# Patient Record
Sex: MALE | Race: WHITE | NOT HISPANIC OR LATINO | Employment: FULL TIME | URBAN - METROPOLITAN AREA
[De-identification: names, ages, dates, MRNs, and addresses within clinical notes are randomized per-mention and may not be internally consistent; named-entity substitution may affect disease eponyms.]

---

## 2018-04-17 ENCOUNTER — CONVERSION ENCOUNTER (OUTPATIENT)
Dept: FAMILY MEDICINE CLINIC | Facility: CLINIC | Age: 63
End: 2018-04-17

## 2018-04-17 LAB
ALBUMIN SERPL-MCNC: 4.3 G/DL (ref 3.6–5.1)
ALBUMIN/GLOB SERPL: ABNORMAL {RATIO} (ref 1–2.5)
ALP SERPL-CCNC: 101 UNITS/L (ref 40–115)
ALT SERPL-CCNC: 17 UNITS/L (ref 9–46)
AST SERPL-CCNC: 19 UNITS/L (ref 10–35)
BILIRUB SERPL-MCNC: 0.9 MG/DL (ref 0.2–1.2)
BUN SERPL-MCNC: 14 MG/DL (ref 7–25)
BUN/CREAT SERPL: ABNORMAL (ref 6–22)
CALCIUM SERPL-MCNC: 9.7 MG/DL (ref 8.6–10.3)
CHLORIDE SERPL-SCNC: 101 MMOL/L (ref 98–110)
CHOLEST SERPL-MCNC: 109 MG/DL
CHOLEST/HDLC SERPL: ABNORMAL {RATIO}
CO2 CONTENT VENOUS: 28 MMOL/L (ref 20–31)
CONV TOTAL PROTEIN: 7.2 G/DL (ref 6.1–8.1)
CREAT UR-MCNC: 1.15 MG/DL (ref 0.7–1.25)
ERYTHROCYTE [DISTWIDTH] IN BLOOD BY AUTOMATED COUNT: 13.6 % (ref 11–15)
GLOBULIN UR ELPH-MCNC: ABNORMAL G/DL (ref 1.9–3.7)
GLUCOSE SERPL-MCNC: 138 MG/DL (ref 65–99)
HCT VFR BLD AUTO: 43.6 % (ref 38.5–50)
HDLC SERPL-MCNC: 33 MG/DL
HGB BLD-MCNC: 15.1 G/DL (ref 13.2–17.1)
LDLC SERPL CALC-MCNC: ABNORMAL MG/DL
MCH RBC QN AUTO: 30.1 PG (ref 27–33)
MCHC RBC AUTO-ENTMCNC: ABNORMAL % (ref 32–36)
MCV RBC AUTO: 87 FL (ref 80–100)
PLATELET # BLD AUTO: ABNORMAL 10*3/MM3 (ref 140–400)
PMV BLD AUTO: 10.6 FL (ref 7.5–12.5)
POTASSIUM SERPL-SCNC: 4.3 MMOL/L (ref 3.5–5.3)
RBC # BLD AUTO: ABNORMAL 10*6/MM3 (ref 4.2–5.8)
SODIUM SERPL-SCNC: 138 MMOL/L (ref 135–146)
TRIGL SERPL-MCNC: 76 MG/DL
WBC # BLD AUTO: ABNORMAL K/UL (ref 3.8–10.8)

## 2019-02-20 ENCOUNTER — ON CAMPUS - OUTPATIENT (OUTPATIENT)
Dept: RURAL HOSPITAL 3 | Facility: HOSPITAL | Age: 64
End: 2019-02-20
Payer: COMMERCIAL

## 2019-02-20 DIAGNOSIS — Z86.010 PERSONAL HISTORY OF COLONIC POLYPS: ICD-10-CM

## 2019-02-20 DIAGNOSIS — K57.30 DIVERTICULOSIS OF LARGE INTESTINE WITHOUT PERFORATION OR ABS: ICD-10-CM

## 2019-02-20 DIAGNOSIS — D12.4 BENIGN NEOPLASM OF DESCENDING COLON: ICD-10-CM

## 2019-02-20 DIAGNOSIS — D12.2 BENIGN NEOPLASM OF ASCENDING COLON: ICD-10-CM

## 2019-02-20 DIAGNOSIS — D12.8 BENIGN NEOPLASM OF RECTUM: ICD-10-CM

## 2019-02-20 PROCEDURE — 45385 COLONOSCOPY W/LESION REMOVAL: CPT | Mod: 33 | Performed by: INTERNAL MEDICINE

## 2019-04-01 ENCOUNTER — HOSPITAL ENCOUNTER (OUTPATIENT)
Dept: OTHER | Facility: HOSPITAL | Age: 64
Discharge: HOME | End: 2019-04-01
Attending: UROLOGY | Admitting: UROLOGY

## 2019-04-23 LAB
ALBUMIN SERPL-MCNC: 4.2 G/DL (ref 3.6–5.1)
ALBUMIN/GLOB SERPL: ABNORMAL {RATIO} (ref 1–2.5)
ALP SERPL-CCNC: 71 UNITS/L (ref 40–115)
ALT SERPL-CCNC: 14 UNITS/L (ref 9–46)
AST SERPL-CCNC: 17 UNITS/L (ref 10–35)
BILIRUB SERPL-MCNC: 0.7 MG/DL (ref 0.2–1.2)
BUN SERPL-MCNC: 14 MG/DL (ref 7–25)
BUN/CREAT SERPL: ABNORMAL (ref 6–22)
CALCIUM SERPL-MCNC: 9.9 MG/DL (ref 8.6–10.3)
CHLORIDE SERPL-SCNC: 103 MMOL/L (ref 98–110)
CHOLEST SERPL-MCNC: 134 MG/DL
CHOLEST/HDLC SERPL: ABNORMAL {RATIO}
CO2 CONTENT VENOUS: 31 MMOL/L (ref 20–32)
CONV TOTAL PROTEIN: 7.1 G/DL (ref 6.1–8.1)
CREAT UR-MCNC: 1.26 MG/DL (ref 0.7–1.25)
GLOBULIN UR ELPH-MCNC: ABNORMAL G/DL (ref 1.9–3.7)
GLUCOSE SERPL-MCNC: 97 MG/DL (ref 65–99)
HDLC SERPL-MCNC: 38 MG/DL
LDLC SERPL CALC-MCNC: ABNORMAL MG/DL
POTASSIUM SERPL-SCNC: 4.3 MMOL/L (ref 3.5–5.3)
PSA SERPL-MCNC: 5.1 NG/ML
SODIUM SERPL-SCNC: 141 MMOL/L (ref 135–146)
TRIGL SERPL-MCNC: 109 MG/DL
TSH SERPL-ACNC: 2.44 MICROINTL UNITS/ML (ref 0.4–4.5)

## 2019-04-24 ENCOUNTER — HOSPITAL ENCOUNTER (OUTPATIENT)
Dept: OTHER | Facility: HOSPITAL | Age: 64
Discharge: HOME | End: 2019-04-24
Attending: NURSE PRACTITIONER | Admitting: NURSE PRACTITIONER

## 2019-07-14 RX ORDER — LISINOPRIL AND HYDROCHLOROTHIAZIDE 20; 12.5 MG/1; MG/1
TABLET ORAL
Qty: 60 TABLET | Refills: 0 | OUTPATIENT
Start: 2019-07-14

## 2019-07-15 DIAGNOSIS — I10 ESSENTIAL HYPERTENSION: Primary | ICD-10-CM

## 2019-07-15 PROBLEM — K21.9 GASTROESOPHAGEAL REFLUX DISEASE: Status: ACTIVE | Noted: 2017-08-24

## 2019-07-15 PROBLEM — R07.9 CHEST PAIN: Status: ACTIVE | Noted: 2019-04-23

## 2019-07-15 PROBLEM — R97.20 ELEVATED PSA: Status: ACTIVE | Noted: 2019-04-24

## 2019-07-15 PROBLEM — N40.0 BENIGN PROSTATIC HYPERPLASIA: Status: ACTIVE | Noted: 2017-06-19

## 2019-07-15 PROBLEM — R06.00 DYSPNEA: Status: ACTIVE | Noted: 2017-08-24

## 2019-07-15 PROBLEM — N28.9 RENAL INSUFFICIENCY, MILD: Status: ACTIVE | Noted: 2019-04-24

## 2019-07-15 PROBLEM — R94.31 LEFT AXIS DEVIATION: Status: ACTIVE | Noted: 2019-04-23

## 2019-07-15 PROBLEM — E78.5 HYPERLIPIDEMIA: Status: ACTIVE | Noted: 2017-06-19

## 2019-07-15 RX ORDER — SIMVASTATIN 40 MG
TABLET ORAL EVERY 24 HOURS
COMMUNITY
Start: 2017-10-15 | End: 2019-08-28 | Stop reason: ALTCHOICE

## 2019-07-15 RX ORDER — METOPROLOL SUCCINATE 50 MG/1
TABLET, EXTENDED RELEASE ORAL EVERY 24 HOURS
COMMUNITY
Start: 2018-04-23 | End: 2020-12-29 | Stop reason: SDUPTHER

## 2019-07-15 RX ORDER — AMLODIPINE BESYLATE 10 MG/1
TABLET ORAL EVERY 24 HOURS
COMMUNITY
Start: 2017-10-15 | End: 2019-08-28 | Stop reason: SDUPTHER

## 2019-07-15 RX ORDER — LISINOPRIL AND HYDROCHLOROTHIAZIDE 20; 12.5 MG/1; MG/1
TABLET ORAL EVERY 24 HOURS
COMMUNITY
Start: 2018-05-15 | End: 2019-07-15 | Stop reason: SDUPTHER

## 2019-07-22 RX ORDER — LISINOPRIL AND HYDROCHLOROTHIAZIDE 20; 12.5 MG/1; MG/1
2 TABLET ORAL EVERY 24 HOURS
Qty: 60 TABLET | Refills: 0 | Status: SHIPPED | OUTPATIENT
Start: 2019-07-22 | End: 2019-08-29 | Stop reason: SDUPTHER

## 2019-07-31 RX ORDER — OMEPRAZOLE 20 MG/1
CAPSULE, DELAYED RELEASE ORAL
Qty: 30 CAPSULE | Refills: 5 | Status: SHIPPED | OUTPATIENT
Start: 2019-07-31 | End: 2020-02-03 | Stop reason: SDUPTHER

## 2019-08-06 ENCOUNTER — TELEPHONE (OUTPATIENT)
Dept: FAMILY MEDICINE CLINIC | Facility: CLINIC | Age: 64
End: 2019-08-06

## 2019-08-06 DIAGNOSIS — R79.89 ELEVATED SERUM CREATININE: Primary | ICD-10-CM

## 2019-08-06 NOTE — TELEPHONE ENCOUNTER
Patient was advised on his voicemail. Order in chart.     ----- Message from Deidra Mahan MA sent at 7/18/2019 12:05 PM EDT -----  Regarding: BMP due  BMP due

## 2019-08-11 ENCOUNTER — RESULTS ENCOUNTER (OUTPATIENT)
Dept: FAMILY MEDICINE CLINIC | Facility: CLINIC | Age: 64
End: 2019-08-11

## 2019-08-11 DIAGNOSIS — R79.89 ELEVATED SERUM CREATININE: ICD-10-CM

## 2019-08-28 RX ORDER — AMLODIPINE BESYLATE 10 MG/1
TABLET ORAL
Qty: 30 TABLET | Refills: 3 | Status: SHIPPED | OUTPATIENT
Start: 2019-08-28 | End: 2020-01-11

## 2019-08-29 DIAGNOSIS — I10 ESSENTIAL HYPERTENSION: ICD-10-CM

## 2019-08-30 ENCOUNTER — TELEPHONE (OUTPATIENT)
Dept: FAMILY MEDICINE CLINIC | Facility: CLINIC | Age: 64
End: 2019-08-30

## 2019-08-30 RX ORDER — LISINOPRIL AND HYDROCHLOROTHIAZIDE 20; 12.5 MG/1; MG/1
TABLET ORAL
Qty: 60 TABLET | Refills: 0 | Status: SHIPPED | OUTPATIENT
Start: 2019-08-30 | End: 2019-09-11 | Stop reason: SDUPTHER

## 2019-08-30 NOTE — TELEPHONE ENCOUNTER
I called him on 08/12/2019 and left a message reminding him. He has yet to do the labs. Will call him again today. I have the Cardiovascular Report and Stress printed and on your desk.

## 2019-08-30 NOTE — TELEPHONE ENCOUNTER
Getting refills on meds, when pt was to have labs on 8/6.   Please see if he had labs somewhere?  I do not have them.  I see that I sent him for a cardiac work up in April, but I can't see the note for fu.  Please get the note from the other system

## 2019-08-31 LAB
BUN SERPL-MCNC: 20 MG/DL (ref 8–27)
BUN/CREAT SERPL: 16 (ref 10–24)
CALCIUM SERPL-MCNC: 9.4 MG/DL (ref 8.6–10.2)
CHLORIDE SERPL-SCNC: 104 MMOL/L (ref 96–106)
CO2 SERPL-SCNC: 23 MMOL/L (ref 20–29)
CREAT SERPL-MCNC: 1.26 MG/DL (ref 0.76–1.27)
GLUCOSE SERPL-MCNC: 166 MG/DL (ref 65–99)
POTASSIUM SERPL-SCNC: 4.2 MMOL/L (ref 3.5–5.2)
SODIUM SERPL-SCNC: 142 MMOL/L (ref 134–144)

## 2019-09-11 ENCOUNTER — OFFICE VISIT (OUTPATIENT)
Dept: FAMILY MEDICINE CLINIC | Facility: CLINIC | Age: 64
End: 2019-09-11

## 2019-09-11 VITALS
RESPIRATION RATE: 16 BRPM | HEIGHT: 75 IN | WEIGHT: 244 LBS | HEART RATE: 51 BPM | OXYGEN SATURATION: 98 % | TEMPERATURE: 98 F | DIASTOLIC BLOOD PRESSURE: 92 MMHG | SYSTOLIC BLOOD PRESSURE: 144 MMHG | BODY MASS INDEX: 30.34 KG/M2

## 2019-09-11 DIAGNOSIS — N28.9 RENAL INSUFFICIENCY, MILD: Primary | ICD-10-CM

## 2019-09-11 DIAGNOSIS — I10 ESSENTIAL HYPERTENSION: ICD-10-CM

## 2019-09-11 DIAGNOSIS — C61 PROSTATE CANCER (HCC): ICD-10-CM

## 2019-09-11 PROCEDURE — 99213 OFFICE O/P EST LOW 20 MIN: CPT | Performed by: NURSE PRACTITIONER

## 2019-09-11 RX ORDER — LISINOPRIL AND HYDROCHLOROTHIAZIDE 20; 12.5 MG/1; MG/1
2 TABLET ORAL DAILY
Qty: 60 TABLET | Refills: 0 | Status: SHIPPED | OUTPATIENT
Start: 2019-09-11 | End: 2020-02-26

## 2019-09-11 NOTE — PROGRESS NOTES
Chief Complaint   Patient presents with   • Hyperglycemia   • Hypertension   • Elevated PSA        Subjective   Sergei Vance is a 64 y.o. male who presents today for htn and labs    HPI:  Pt was to have cr repeat a while back.  He also had an increase in b/p meds and had not returned.  He had labs last week and his bs was 166.  He now says he was not fasting.  He needs a b/p refill and he is fu on a plan for monitoring prostate cancer after a surgeon appt.  He has a fu next week with Dr Bradley.    Sergei Vance  has a past medical history of GERD (gastroesophageal reflux disease) and Hypertension.    No Known Allergies    Current Outpatient Medications:   •  amLODIPine (NORVASC) 10 MG tablet, TAKE 1 TABLET BY MOUTH EVERY DAY, Disp: 30 tablet, Rfl: 3  •  lisinopril-hydrochlorothiazide (PRINZIDE,ZESTORETIC) 20-12.5 MG per tablet, Take 2 tablets by mouth Daily., Disp: 60 tablet, Rfl: 0  •  metoprolol succinate XL (TOPROL-XL) 50 MG 24 hr tablet, Daily., Disp: , Rfl:   •  omeprazole (priLOSEC) 20 MG capsule, TAKE 1 CAPSULE BY MOUTH EVERY MORNING ON EMPTY STOMACH, Disp: 30 capsule, Rfl: 5  Past Medical History:   Diagnosis Date   • GERD (gastroesophageal reflux disease)    • Hypertension      Past Surgical History:   Procedure Laterality Date   • VASECTOMY       Social History     Socioeconomic History   • Marital status:      Spouse name: Not on file   • Number of children: Not on file   • Years of education: Not on file   • Highest education level: Not on file   Tobacco Use   • Smoking status: Never Smoker   • Smokeless tobacco: Never Used   Substance and Sexual Activity   • Alcohol use: Yes     Frequency: Never   • Drug use: No     Family History   Problem Relation Age of Onset   • No Known Problems Mother    • No Known Problems Father        Family history, surgical history, past medical history, Allergies and med's reviewed with patient today and updated in [facility] EMR.   PHQ-2 Depression Screening  Little  "interest or pleasure in doing things? 0   Feeling down, depressed, or hopeless? 0   PHQ-2 Total Score 0   PHQ-9 Depression Screening  Little interest or pleasure in doing things? 0   Feeling down, depressed, or hopeless? 0   Trouble falling or staying asleep, or sleeping too much?     Feeling tired or having little energy?     Poor appetite or overeating?     Feeling bad about yourself - or that you are a failure or have let yourself or your family down?     Trouble concentrating on things, such as reading the newspaper or watching television?     Moving or speaking so slowly that other people could have noticed? Or the opposite - being so fidgety or restless that you have been moving around a lot more than usual?     Thoughts that you would be better off dead, or of hurting yourself in some way?     PHQ-9 Total Score 0   If you checked off any problems, how difficult have these problems made it for you to do your work, take care of things at home, or get along with other people?       ROS:  Review of Systems   Constitutional: Negative for fatigue and fever.   Respiratory: Negative for shortness of breath.    Cardiovascular: Negative for chest pain.   Endocrine: Negative for polyphagia and polyuria.       OBJECTIVE:  Vitals:    09/11/19 1032   BP: 144/92   BP Location: Left arm   Patient Position: Sitting   Cuff Size: Large Adult   Pulse: 51   Resp: 16   Temp: 98 °F (36.7 °C)   TempSrc: Oral   SpO2: 98%   Weight: 111 kg (244 lb)   Height: 190.5 cm (75\")     Physical Exam   Cardiovascular: Normal rate, regular rhythm, normal heart sounds and intact distal pulses. Exam reveals no gallop and no friction rub.   No murmur heard.  Pulmonary/Chest: Breath sounds normal. He has no wheezes.   Nursing note and vitals reviewed.      ASSESSMENT/ PLAN:    Sergei was seen today for hyperglycemia, hypertension and elevated psa.    Diagnoses and all orders for this visit:    Renal insufficiency, mild  Comments:  labs are normal "   elevated bs was nonfasting    Essential hypertension  Comments:  cont meds  Orders:  -     lisinopril-hydrochlorothiazide (PRINZIDE,ZESTORETIC) 20-12.5 MG per tablet; Take 2 tablets by mouth Daily.    Prostate cancer (CMS/HCC)  Comments:  cont work up         Orders Placed Today:     New Medications Ordered This Visit   Medications   • lisinopril-hydrochlorothiazide (PRINZIDE,ZESTORETIC) 20-12.5 MG per tablet     Sig: Take 2 tablets by mouth Daily.     Dispense:  60 tablet     Refill:  0        Management Plan:     An After Visit Summary was printed and given to the patient at discharge.    Follow-up: Return April 2019, for Annual physical.    JO ANN Diaz 9/11/2019 12:23 PM  This note was electronically signed.

## 2019-09-23 ENCOUNTER — OFFICE VISIT (OUTPATIENT)
Dept: FAMILY MEDICINE CLINIC | Facility: CLINIC | Age: 64
End: 2019-09-23

## 2019-09-23 VITALS
SYSTOLIC BLOOD PRESSURE: 132 MMHG | HEART RATE: 50 BPM | OXYGEN SATURATION: 98 % | TEMPERATURE: 98 F | RESPIRATION RATE: 18 BRPM | WEIGHT: 245 LBS | BODY MASS INDEX: 35.07 KG/M2 | HEIGHT: 70 IN | DIASTOLIC BLOOD PRESSURE: 68 MMHG

## 2019-09-23 DIAGNOSIS — K64.4 RESIDUAL HEMORRHOIDAL SKIN TAGS: Primary | ICD-10-CM

## 2019-09-23 DIAGNOSIS — C61 PROSTATE CANCER (HCC): ICD-10-CM

## 2019-09-23 PROCEDURE — 99213 OFFICE O/P EST LOW 20 MIN: CPT | Performed by: FAMILY MEDICINE

## 2019-09-23 RX ORDER — DIAZEPAM 10 MG/1
TABLET ORAL
Refills: 0 | COMMUNITY
Start: 2019-09-16 | End: 2020-07-29

## 2019-09-23 NOTE — ASSESSMENT & PLAN NOTE
I am not too concerned about cancer since he recently had a colonoscopy that was unremarkable.  I think he has an anal tag versus hemorrhoid that is causing his bleeding.  It is mild in nature.  We will go ahead and set him up with surgery for excision.

## 2019-09-23 NOTE — PROGRESS NOTES
Patient presents with rectal bleeding that is mild in nature for the past several months.  He had a normal colonoscopy earlier this year.  He had recently diagnosed prostate cancer and has undergone radiation, however the radiation treatment did not start until after the rectal bleeding.  He has no diarrhea or pain.  He has not had weight loss.  He does have some external hemorrhoids.      Past Medical History:   Diagnosis Date   • Abnormal stress test    • BPH (benign prostatic hyperplasia)    • Elevated PSA    • GERD (gastroesophageal reflux disease)    • Hyperlipidemia    • Hypertension    • Prostate cancer (CMS/HCC)    • Renal insufficiency      Past Surgical History:   Procedure Laterality Date   • VASECTOMY       Family History   Problem Relation Age of Onset   • No Known Problems Mother    • No Known Problems Father    • Colon cancer Maternal Grandmother    • Prostate cancer Maternal Grandfather      Social History     Tobacco Use   • Smoking status: Former Smoker     Types: Cigarettes     Last attempt to quit: 2009     Years since quitting: 10.7   • Smokeless tobacco: Former User     Types: Chew     Quit date: 2016   Substance Use Topics   • Alcohol use: Yes     Alcohol/week: 1.2 oz     Types: 2 Cans of beer per week     Frequency: Never     PHQ-2 Depression Screening  Little interest or pleasure in doing things?     Feeling down, depressed, or hopeless?     PHQ-2 Total Score       PHQ-9 Depression Screening  Little interest or pleasure in doing things?     Feeling down, depressed, or hopeless?     Trouble falling or staying asleep, or sleeping too much?     Feeling tired or having little energy?     Poor appetite or overeating?     Feeling bad about yourself - or that you are a failure or have let yourself or your family down?     Trouble concentrating on things, such as reading the newspaper or watching television?     Moving or speaking so slowly that other people could have noticed? Or the opposite -  "being so fidgety or restless that you have been moving around a lot more than usual?     Thoughts that you would be better off dead, or of hurting yourself in some way?     PHQ-9 Total Score     If you checked off any problems, how difficult have these problems made it for you to do your work, take care of things at home, or get along with other people?         Review of Systems   Constitutional: Negative for chills and fatigue.   Respiratory: Negative for cough and shortness of breath.    Cardiovascular: Negative for chest pain and palpitations.   Gastrointestinal: Positive for anal bleeding and blood in stool.   Musculoskeletal: Negative for arthralgias, back pain and myalgias.   Skin: Negative for rash.   Neurological: Negative for dizziness and headache.     Vitals:    09/23/19 0946   BP: 144/78   BP Location: Left arm   Cuff Size: Adult   Pulse: 50   Resp: 18   Temp: 98 °F (36.7 °C)   TempSrc: Oral   SpO2: 98%   Weight: 111 kg (245 lb)   Height: 177.8 cm (70\")     Body mass index is 35.15 kg/m².  Physical Exam   Constitutional: He is oriented to person, place, and time. He appears well-developed and well-nourished. No distress.   Cardiovascular: Normal rate, regular rhythm and normal heart sounds.   No murmur heard.  Pulmonary/Chest: Effort normal and breath sounds normal. He has no wheezes. He has no rales.   Abdominal: Soft. Bowel sounds are normal. He exhibits no distension.   Genitourinary: Rectal exam shows external hemorrhoid.   Genitourinary Comments: With large hemorrhoid/anal tag   Musculoskeletal: Normal range of motion.   Neurological: He is alert and oriented to person, place, and time.   Skin: Skin is warm and dry.   Psychiatric: He has a normal mood and affect. His behavior is normal.     No visits with results within 7 Day(s) from this visit.   Latest known visit with results is:   Results Encounter on 08/11/2019   Component Date Value Ref Range Status   • Glucose 08/30/2019 166* 65 - 99 mg/dL " Final   • BUN 08/30/2019 20  8 - 27 mg/dL Final   • Creatinine 08/30/2019 1.26  0.76 - 1.27 mg/dL Final   • eGFR Non African Am 08/30/2019 60  >59 mL/min/1.73 Final   • eGFR African Am 08/30/2019 70  >59 mL/min/1.73 Final   • BUN/Creatinine Ratio 08/30/2019 16  10 - 24 Final   • Sodium 08/30/2019 142  134 - 144 mmol/L Final   • Potassium 08/30/2019 4.2  3.5 - 5.2 mmol/L Final   • Chloride 08/30/2019 104  96 - 106 mmol/L Final   • Total CO2 08/30/2019 23  20 - 29 mmol/L Final   • Calcium 08/30/2019 9.4  8.6 - 10.2 mg/dL Final         Diagnoses and all orders for this visit:    1. Residual hemorrhoidal skin tags (Primary)  Assessment & Plan:  I am not too concerned about cancer since he recently had a colonoscopy that was unremarkable.  I think he has an anal tag versus hemorrhoid that is causing his bleeding.  It is mild in nature.  We will go ahead and set him up with surgery for excision.     Orders:  -     Ambulatory Referral to General Surgery    2. Prostate cancer (CMS/HCC)  Assessment & Plan:  Managed by Dr. Bradley with recent radiation.  I do not believe this is radiation proctitis since the bleeding preceded the initiation of radiation therapy.

## 2019-09-23 NOTE — ASSESSMENT & PLAN NOTE
Managed by Dr. Bradley with recent radiation.  I do not believe this is radiation proctitis since the bleeding preceded the initiation of radiation therapy.

## 2019-09-24 ENCOUNTER — TELEPHONE (OUTPATIENT)
Dept: FAMILY MEDICINE CLINIC | Facility: CLINIC | Age: 64
End: 2019-09-24

## 2019-09-24 NOTE — TELEPHONE ENCOUNTER
----- Message from Reggie Duane Lyell, MD sent at 9/23/2019  9:51 AM EDT -----  Get records from Dr. Rosendo Bradley

## 2019-10-01 ENCOUNTER — TELEPHONE (OUTPATIENT)
Dept: FAMILY MEDICINE CLINIC | Facility: CLINIC | Age: 64
End: 2019-10-01

## 2019-10-15 ENCOUNTER — OFFICE VISIT (OUTPATIENT)
Dept: SURGERY | Facility: CLINIC | Age: 64
End: 2019-10-15

## 2019-10-15 VITALS
OXYGEN SATURATION: 98 % | WEIGHT: 245.2 LBS | SYSTOLIC BLOOD PRESSURE: 152 MMHG | HEIGHT: 70 IN | TEMPERATURE: 97.3 F | HEART RATE: 60 BPM | DIASTOLIC BLOOD PRESSURE: 92 MMHG | BODY MASS INDEX: 35.1 KG/M2

## 2019-10-15 DIAGNOSIS — K64.4 RESIDUAL HEMORRHOIDAL SKIN TAGS: Primary | ICD-10-CM

## 2019-10-15 PROCEDURE — 99203 OFFICE O/P NEW LOW 30 MIN: CPT | Performed by: SURGERY

## 2019-10-15 NOTE — PROGRESS NOTES
Subjective   Sergei Vance is a 64 y.o. male.     History of present illness  Sergei is a pleasant 64-year-old seen in the office today at his primary physician's request of Dr. Lugo, for symptomatic external hemorrhoid tags.  Patient states that he has had significant problems in the past with hemorrhoids.  For the past year he has had some spotting when wiping and some discomfort.  He assumed that it was from the hemorrhoids.  A colonoscopy recently and it showed some polyps which were removed but no other abnormality.        Past Medical History:   Diagnosis Date   • Abnormal stress test    • BPH (benign prostatic hyperplasia)    • Elevated PSA    • GERD (gastroesophageal reflux disease)    • Hyperlipidemia    • Hypertension    • Prostate cancer (CMS/HCC)    • Renal insufficiency        Past Surgical History:   Procedure Laterality Date   • VASECTOMY         Outpatient Encounter Medications as of 10/15/2019   Medication Sig Dispense Refill   • amLODIPine (NORVASC) 10 MG tablet TAKE 1 TABLET BY MOUTH EVERY DAY 30 tablet 3   • diazePAM (VALIUM) 10 MG tablet TAKE 2 TABLETS BY MOUTH 1 HOUR PRIOR TO APPOINTMENT  0   • lisinopril-hydrochlorothiazide (PRINZIDE,ZESTORETIC) 20-12.5 MG per tablet Take 2 tablets by mouth Daily. 60 tablet 0   • metoprolol succinate XL (TOPROL-XL) 50 MG 24 hr tablet Daily.     • omeprazole (priLOSEC) 20 MG capsule TAKE 1 CAPSULE BY MOUTH EVERY MORNING ON EMPTY STOMACH 30 capsule 5     No facility-administered encounter medications on file as of 10/15/2019.        No Known Allergies    Family History   Problem Relation Age of Onset   • No Known Problems Mother    • No Known Problems Father    • Colon cancer Maternal Grandmother    • Prostate cancer Maternal Grandfather        Social History     Socioeconomic History   • Marital status:      Spouse name: Not on file   • Number of children: Not on file   • Years of education: Not on file   • Highest education level: Not on file    Tobacco Use   • Smoking status: Former Smoker     Types: Cigarettes     Last attempt to quit: 2009     Years since quitting: 10.7   • Smokeless tobacco: Former User     Types: Chew     Quit date: 2016   Substance and Sexual Activity   • Alcohol use: Yes     Alcohol/week: 1.2 oz     Types: 2 Cans of beer per week     Frequency: Never   • Drug use: No       The following portions of the patient's history were reviewed and updated as appropriate: allergies, current medications, past family history, past medical history, past social history, past surgical history and problem list.    Objective       Assessment/Plan   There are no diagnoses linked to this encounter.      Complete review of systems is done and unremarkable with exception of the chief complaint.      Exam shows a pleasant 64-year-old male.  HEENT is negative.  Heart regular.  Lungs clear.  Abdomen soft nontender.  Extremities show equal range of motion in the upper and lower extremities.  He has symmetrical strength and usage.  Neuro shows no obvious focal deficit.  Rectal exam shows an active external skin tags and a posterior midline fissure.    Impression: Posterior midline fissure, inactive hemorrhoid tags    Recommendation: He needs to be on a stool softener daily, use Anusol HC cream 3 times daily for a week then twice daily for a week and then daily so long as he is having bleeding from the fissure    Call if he decides to proceed with surgery.  If we do surgery we would remove the tags and doing a lateral internal sphincterotomy.    Ramakrishna Magdaleno,   10/15/2019  11:49 AM

## 2020-01-11 RX ORDER — AMLODIPINE BESYLATE 10 MG/1
TABLET ORAL
Qty: 30 TABLET | Refills: 3 | Status: SHIPPED | OUTPATIENT
Start: 2020-01-11 | End: 2020-04-26

## 2020-02-03 RX ORDER — OMEPRAZOLE 20 MG/1
CAPSULE, DELAYED RELEASE ORAL
Qty: 30 CAPSULE | Refills: 5 | Status: SHIPPED | OUTPATIENT
Start: 2020-02-03 | End: 2020-12-29 | Stop reason: SDUPTHER

## 2020-02-11 ENCOUNTER — TELEPHONE (OUTPATIENT)
Dept: FAMILY MEDICINE CLINIC | Facility: CLINIC | Age: 65
End: 2020-02-11

## 2020-02-11 NOTE — TELEPHONE ENCOUNTER
Patient called and says that his eye doctor is wanting him to take Areds 2 formula eye vitamins. But was told to check with you to make sure its okay to take these with his current meds. Please advise.

## 2020-02-26 DIAGNOSIS — I10 ESSENTIAL HYPERTENSION: ICD-10-CM

## 2020-02-26 RX ORDER — LISINOPRIL AND HYDROCHLOROTHIAZIDE 20; 12.5 MG/1; MG/1
TABLET ORAL
Qty: 60 TABLET | Refills: 0 | Status: SHIPPED | OUTPATIENT
Start: 2020-02-26 | End: 2020-03-25 | Stop reason: SDUPTHER

## 2020-03-23 PROBLEM — D36.9 ADENOMATOUS POLYP: Status: ACTIVE | Noted: 2020-03-23

## 2020-03-25 DIAGNOSIS — I10 ESSENTIAL HYPERTENSION: ICD-10-CM

## 2020-03-25 RX ORDER — LISINOPRIL AND HYDROCHLOROTHIAZIDE 20; 12.5 MG/1; MG/1
TABLET ORAL
Qty: 60 TABLET | Refills: 0 | Status: SHIPPED | OUTPATIENT
Start: 2020-03-25 | End: 2020-04-20

## 2020-04-19 DIAGNOSIS — I10 ESSENTIAL HYPERTENSION: ICD-10-CM

## 2020-04-20 RX ORDER — LISINOPRIL AND HYDROCHLOROTHIAZIDE 20; 12.5 MG/1; MG/1
TABLET ORAL
Qty: 60 TABLET | Refills: 0 | Status: SHIPPED | OUTPATIENT
Start: 2020-04-20 | End: 2020-05-17 | Stop reason: SDUPTHER

## 2020-04-26 RX ORDER — AMLODIPINE BESYLATE 10 MG/1
TABLET ORAL
Qty: 30 TABLET | Refills: 3 | Status: SHIPPED | OUTPATIENT
Start: 2020-04-26 | End: 2020-08-24

## 2020-05-17 DIAGNOSIS — I10 ESSENTIAL HYPERTENSION: ICD-10-CM

## 2020-05-17 RX ORDER — LISINOPRIL AND HYDROCHLOROTHIAZIDE 20; 12.5 MG/1; MG/1
TABLET ORAL
Qty: 60 TABLET | Refills: 0 | Status: SHIPPED | OUTPATIENT
Start: 2020-05-17 | End: 2020-06-15

## 2020-06-15 DIAGNOSIS — I10 ESSENTIAL HYPERTENSION: ICD-10-CM

## 2020-06-15 RX ORDER — LISINOPRIL AND HYDROCHLOROTHIAZIDE 20; 12.5 MG/1; MG/1
TABLET ORAL
Qty: 60 TABLET | Refills: 0 | Status: SHIPPED | OUTPATIENT
Start: 2020-06-15 | End: 2020-07-08

## 2020-07-08 DIAGNOSIS — I10 ESSENTIAL HYPERTENSION: ICD-10-CM

## 2020-07-08 RX ORDER — LISINOPRIL AND HYDROCHLOROTHIAZIDE 20; 12.5 MG/1; MG/1
TABLET ORAL
Qty: 60 TABLET | Refills: 0 | Status: SHIPPED | OUTPATIENT
Start: 2020-07-08 | End: 2020-08-04

## 2020-07-29 ENCOUNTER — OFFICE VISIT (OUTPATIENT)
Dept: FAMILY MEDICINE CLINIC | Facility: CLINIC | Age: 65
End: 2020-07-29

## 2020-07-29 VITALS
HEART RATE: 76 BPM | WEIGHT: 242 LBS | TEMPERATURE: 97.9 F | SYSTOLIC BLOOD PRESSURE: 148 MMHG | RESPIRATION RATE: 16 BRPM | BODY MASS INDEX: 34.65 KG/M2 | HEIGHT: 70 IN | OXYGEN SATURATION: 97 % | DIASTOLIC BLOOD PRESSURE: 85 MMHG

## 2020-07-29 DIAGNOSIS — N28.9 RENAL INSUFFICIENCY, MILD: ICD-10-CM

## 2020-07-29 DIAGNOSIS — E78.2 MIXED HYPERLIPIDEMIA: ICD-10-CM

## 2020-07-29 DIAGNOSIS — Z00.00 ENCOUNTER FOR PREVENTIVE HEALTH EXAMINATION: Primary | ICD-10-CM

## 2020-07-29 DIAGNOSIS — Z11.59 NEED FOR HEPATITIS C SCREENING TEST: ICD-10-CM

## 2020-07-29 DIAGNOSIS — N40.1 BENIGN PROSTATIC HYPERPLASIA WITH NOCTURIA: ICD-10-CM

## 2020-07-29 DIAGNOSIS — C61 PROSTATE CANCER (HCC): ICD-10-CM

## 2020-07-29 DIAGNOSIS — R35.1 BENIGN PROSTATIC HYPERPLASIA WITH NOCTURIA: ICD-10-CM

## 2020-07-29 DIAGNOSIS — K21.9 GASTROESOPHAGEAL REFLUX DISEASE, ESOPHAGITIS PRESENCE NOT SPECIFIED: ICD-10-CM

## 2020-07-29 DIAGNOSIS — I10 ESSENTIAL HYPERTENSION: ICD-10-CM

## 2020-07-29 LAB
BILIRUB BLD-MCNC: NEGATIVE MG/DL
CLARITY, POC: CLEAR
COLOR UR: YELLOW
GLUCOSE UR STRIP-MCNC: NEGATIVE MG/DL
KETONES UR QL: NEGATIVE
LEUKOCYTE EST, POC: NEGATIVE
NITRITE UR-MCNC: NEGATIVE MG/ML
PH UR: 6.5 [PH] (ref 5–8)
PROT UR STRIP-MCNC: NEGATIVE MG/DL
RBC # UR STRIP: NEGATIVE /UL
SP GR UR: 1.02 (ref 1–1.03)
UROBILINOGEN UR QL: NORMAL

## 2020-07-29 PROCEDURE — 81003 URINALYSIS AUTO W/O SCOPE: CPT | Performed by: NURSE PRACTITIONER

## 2020-07-29 PROCEDURE — 99396 PREV VISIT EST AGE 40-64: CPT | Performed by: NURSE PRACTITIONER

## 2020-07-29 NOTE — PROGRESS NOTES
Chief Complaint   Patient presents with   • Annual Exam   • Hypertension   • Hyperlipidemia        Subjective   Sergei Vance is a 65 y.o. male who presents today for annual exam, hypertension hyperlipidemia and colon cancer    HPI: Patient is here for his annual exam today.  We have gone over his immunizations in detail.  As well as recommended screenings.  He is not due for colonoscopy.  He has had an eye exam.  He does follow-up with Dr. Bradley for prostate cancer and is trying to make a determination on how that is going to be treated    Patient is taking his blood pressure medicine as prescribed without any side effects as well as trying to watch his diet for his cholesterol.  He does not exercise regularly.  He has no chest pain shortness of breath or cough.    Patient has had some initial signs of glaucoma and cataract is currently being monitored by the ophthalmologist every 3 months.  He has no new or different complaints at this point.            Sergei Vance  has a past medical history of Abnormal stress test, Adenomatous polyp (3/23/2020), BPH (benign prostatic hyperplasia), Elevated PSA, GERD (gastroesophageal reflux disease), Hyperlipidemia, Hypertension, Prostate cancer (CMS/HCC), and Renal insufficiency.    No Known Allergies    Current Outpatient Medications:   •  amLODIPine (NORVASC) 10 MG tablet, TAKE 1 TABLET BY MOUTH EVERY DAY, Disp: 30 tablet, Rfl: 3  •  lisinopril-hydrochlorothiazide (PRINZIDE,ZESTORETIC) 20-12.5 MG per tablet, TAKE 2 TABLETS BY MOUTH EVERY DAY, Disp: 60 tablet, Rfl: 0  •  metoprolol succinate XL (TOPROL-XL) 50 MG 24 hr tablet, Daily., Disp: , Rfl:   •  omeprazole (priLOSEC) 20 MG capsule, TAKE 1 CAPSULE BY MOUTH EVERY MORNING ON EMPTY STOMACH, Disp: 30 capsule, Rfl: 5  Past Medical History:   Diagnosis Date   • Abnormal stress test    • Adenomatous polyp 3/23/2020   • BPH (benign prostatic hyperplasia)    • Elevated PSA    • GERD (gastroesophageal reflux disease)    •  Hyperlipidemia    • Hypertension    • Prostate cancer (CMS/HCC)    • Renal insufficiency      Past Surgical History:   Procedure Laterality Date   • VASECTOMY       Social History     Socioeconomic History   • Marital status:      Spouse name: Not on file   • Number of children: Not on file   • Years of education: Not on file   • Highest education level: Not on file   Tobacco Use   • Smoking status: Former Smoker     Packs/day: 1.00     Types: Cigarettes     Start date:      Last attempt to quit: 2009     Years since quittin.5   • Smokeless tobacco: Former User     Types: Chew     Quit date:    Substance and Sexual Activity   • Alcohol use: Yes     Alcohol/week: 2.0 standard drinks     Types: 2 Cans of beer per week     Frequency: Never   • Drug use: No     Family History   Problem Relation Age of Onset   • No Known Problems Mother    • No Known Problems Father    • Colon cancer Maternal Grandmother    • Prostate cancer Maternal Grandfather        Family history, surgical history, past medical history, Allergies and med's reviewed with patient today and updated in [facility] EMR.   PHQ-2 Depression Screening  Little interest or pleasure in doing things?     Feeling down, depressed, or hopeless?     PHQ-2 Total Score     PHQ-9 Depression Screening  Little interest or pleasure in doing things?     Feeling down, depressed, or hopeless?     Trouble falling or staying asleep, or sleeping too much?     Feeling tired or having little energy?     Poor appetite or overeating?     Feeling bad about yourself - or that you are a failure or have let yourself or your family down?     Trouble concentrating on things, such as reading the newspaper or watching television?     Moving or speaking so slowly that other people could have noticed? Or the opposite - being so fidgety or restless that you have been moving around a lot more than usual?     Thoughts that you would be better off dead, or of hurting yourself in  "some way?     PHQ-9 Total Score     If you checked off any problems, how difficult have these problems made it for you to do your work, take care of things at home, or get along with other people?       ROS:  Review of Systems   Constitutional: Negative for fatigue.   HENT: Negative for ear pain, sinus pain and sore throat.    Eyes: Positive for visual disturbance. Negative for pain.        Being followed by ophthalmology   Respiratory: Negative for cough and shortness of breath.    Cardiovascular: Negative for chest pain.   Gastrointestinal: Negative for abdominal pain, diarrhea and nausea.   Endocrine: Negative for polyuria.   Genitourinary: Positive for difficulty urinating. Negative for decreased urine volume and dysuria.        Prostate enlargement and prostate cancer followed by Dr. Bradley   Musculoskeletal: Negative for arthralgias.   Skin: Negative for rash.   Allergic/Immunologic: Negative for environmental allergies.   Neurological: Negative for dizziness, numbness and headaches.   Hematological: Does not bruise/bleed easily.   Psychiatric/Behavioral: The patient is not nervous/anxious.        OBJECTIVE:  Vitals:    07/29/20 0910   BP: 148/85   BP Location: Left arm   Patient Position: Sitting   Cuff Size: Large Adult   Pulse: 76   Resp: 16   Temp: 97.9 °F (36.6 °C)   TempSrc: Temporal   SpO2: 97%   Weight: 110 kg (242 lb)   Height: 177.8 cm (70\")     Physical Exam   Constitutional: He is oriented to person, place, and time. Vital signs are normal. He appears well-developed.   HENT:   Head: Normocephalic and atraumatic.   Right Ear: Tympanic membrane, external ear and ear canal normal.   Left Ear: Tympanic membrane, external ear and ear canal normal.   Nose: Nose normal.   Mouth/Throat: Uvula is midline, oropharynx is clear and moist and mucous membranes are normal.   Eyes: Pupils are equal, round, and reactive to light. Conjunctivae are normal.   Neck: Normal range of motion. Neck supple.   Cardiovascular: " Regular rhythm and normal heart sounds. Exam reveals no gallop and no friction rub.   No murmur heard.  Pulmonary/Chest: Effort normal and breath sounds normal. He has no wheezes. He has no rhonchi.   Abdominal: Soft. Bowel sounds are normal. He exhibits no distension. There is no hepatosplenomegaly. There is no tenderness.   Musculoskeletal: Normal range of motion.   Neurological: He is alert and oriented to person, place, and time. He displays normal reflexes. No cranial nerve deficit. Coordination normal.   Skin: Skin is warm and dry.   Nursing note and vitals reviewed.      ASSESSMENT/ PLAN:    Sergei was seen today for annual exam, hypertension and hyperlipidemia.    Diagnoses and all orders for this visit:    Encounter for preventive health examination  Comments:  Discussed anticipatory guidance as well as immunizations  Orders:  -     Comprehensive Metabolic Panel  -     Lipid Panel  -     POC Urinalysis Dipstick, Automated    Mixed hyperlipidemia  Comments:  Labs being done today to monitor hyperlipidemia  Orders:  -     Comprehensive Metabolic Panel  -     Lipid Panel    Need for hepatitis C screening test  -     Hepatitis C Antibody    Essential hypertension  Comments:  Continue current blood pressure medications    Gastroesophageal reflux disease, esophagitis presence not specified  Comments:  Only uses Prilosec as needed    Prostate cancer (CMS/HCC)  Comments:  Continue follow-up with Dr. Bradley in October.  Consider option for treatment    Renal insufficiency, mild  Comments:  Labs done today    Benign prostatic hyperplasia with nocturia        Orders Placed Today:     No orders of the defined types were placed in this encounter.       Management Plan:     An After Visit Summary was printed and given to the patient at discharge.    Follow-up: Return in about 6 months (around 1/29/2021) for Recheck htn.    JO ANN Diaz 7/29/2020 09:44  This note was electronically signed.

## 2020-07-30 LAB
ALBUMIN SERPL-MCNC: 4.3 G/DL (ref 3.8–4.8)
ALBUMIN/GLOB SERPL: 1.5 {RATIO} (ref 1.2–2.2)
ALP SERPL-CCNC: 77 IU/L (ref 39–117)
ALT SERPL-CCNC: 17 IU/L (ref 0–44)
AST SERPL-CCNC: 23 IU/L (ref 0–40)
BILIRUB SERPL-MCNC: 0.9 MG/DL (ref 0–1.2)
BUN SERPL-MCNC: 15 MG/DL (ref 8–27)
BUN/CREAT SERPL: 13 (ref 10–24)
CALCIUM SERPL-MCNC: 9.7 MG/DL (ref 8.6–10.2)
CHLORIDE SERPL-SCNC: 104 MMOL/L (ref 96–106)
CHOLEST SERPL-MCNC: 171 MG/DL (ref 100–199)
CO2 SERPL-SCNC: 26 MMOL/L (ref 20–29)
CREAT SERPL-MCNC: 1.18 MG/DL (ref 0.76–1.27)
GLOBULIN SER CALC-MCNC: 2.9 G/DL (ref 1.5–4.5)
GLUCOSE SERPL-MCNC: 101 MG/DL (ref 65–99)
HCV AB S/CO SERPL IA: <0.1 S/CO RATIO (ref 0–0.9)
HDLC SERPL-MCNC: 36 MG/DL
LDLC SERPL CALC-MCNC: 112 MG/DL (ref 0–99)
POTASSIUM SERPL-SCNC: 4.1 MMOL/L (ref 3.5–5.2)
PROT SERPL-MCNC: 7.2 G/DL (ref 6–8.5)
SODIUM SERPL-SCNC: 141 MMOL/L (ref 134–144)
TRIGL SERPL-MCNC: 113 MG/DL (ref 0–149)
VLDLC SERPL CALC-MCNC: 23 MG/DL (ref 5–40)

## 2020-08-04 DIAGNOSIS — I10 ESSENTIAL HYPERTENSION: ICD-10-CM

## 2020-08-04 RX ORDER — LISINOPRIL AND HYDROCHLOROTHIAZIDE 20; 12.5 MG/1; MG/1
TABLET ORAL
Qty: 60 TABLET | Refills: 0 | Status: SHIPPED | OUTPATIENT
Start: 2020-08-04 | End: 2020-09-02

## 2020-08-24 RX ORDER — AMLODIPINE BESYLATE 10 MG/1
TABLET ORAL
Qty: 30 TABLET | Refills: 3 | Status: SHIPPED | OUTPATIENT
Start: 2020-08-24 | End: 2020-12-22

## 2020-09-02 DIAGNOSIS — I10 ESSENTIAL HYPERTENSION: ICD-10-CM

## 2020-09-02 RX ORDER — LISINOPRIL AND HYDROCHLOROTHIAZIDE 20; 12.5 MG/1; MG/1
TABLET ORAL
Qty: 60 TABLET | Refills: 0 | Status: SHIPPED | OUTPATIENT
Start: 2020-09-02 | End: 2021-02-01 | Stop reason: SDUPTHER

## 2020-12-22 RX ORDER — AMLODIPINE BESYLATE 10 MG/1
TABLET ORAL
Qty: 90 TABLET | Refills: 1 | Status: SHIPPED | OUTPATIENT
Start: 2020-12-22 | End: 2021-02-01 | Stop reason: SDUPTHER

## 2020-12-29 RX ORDER — METOPROLOL SUCCINATE 50 MG/1
50 TABLET, EXTENDED RELEASE ORAL DAILY
Qty: 90 TABLET | Refills: 0 | Status: SHIPPED | OUTPATIENT
Start: 2020-12-29 | End: 2021-02-01 | Stop reason: ALTCHOICE

## 2020-12-29 RX ORDER — OMEPRAZOLE 20 MG/1
20 CAPSULE, DELAYED RELEASE ORAL DAILY
Qty: 90 CAPSULE | Refills: 0 | Status: SHIPPED | OUTPATIENT
Start: 2020-12-29 | End: 2021-02-01 | Stop reason: SDUPTHER

## 2021-01-05 ENCOUNTER — TELEPHONE (OUTPATIENT)
Dept: FAMILY MEDICINE CLINIC | Facility: CLINIC | Age: 66
End: 2021-01-05

## 2021-01-05 NOTE — TELEPHONE ENCOUNTER
Patient picked up medication at [facility] and had Metoprolol 50 mg. He thinks this is a new medication and has not taken it before. We have it is an active medication in his med list and he was sent a refill last month for it. Patient said he started taking it today but wants to make sure he is to be on it.

## 2021-01-06 NOTE — TELEPHONE ENCOUNTER
Patient advised he has never taken the Rx before and is wanting to know if he should be. He felt light headed when he took it the other day. It has been in his medication list since 2018, back when he seen cardiology.

## 2021-01-06 NOTE — TELEPHONE ENCOUNTER
Medication has been active in patients chart since 2018. Left message asking patient if he has ever taken the medication.

## 2021-02-01 ENCOUNTER — OFFICE VISIT (OUTPATIENT)
Dept: FAMILY MEDICINE CLINIC | Facility: CLINIC | Age: 66
End: 2021-02-01

## 2021-02-01 VITALS
RESPIRATION RATE: 16 BRPM | SYSTOLIC BLOOD PRESSURE: 139 MMHG | HEART RATE: 54 BPM | OXYGEN SATURATION: 98 % | WEIGHT: 256 LBS | TEMPERATURE: 97.1 F | DIASTOLIC BLOOD PRESSURE: 70 MMHG | HEIGHT: 70 IN | BODY MASS INDEX: 36.65 KG/M2

## 2021-02-01 DIAGNOSIS — N28.9 RENAL INSUFFICIENCY, MILD: ICD-10-CM

## 2021-02-01 DIAGNOSIS — I10 ESSENTIAL HYPERTENSION: ICD-10-CM

## 2021-02-01 DIAGNOSIS — N40.1 BENIGN PROSTATIC HYPERPLASIA WITH NOCTURIA: ICD-10-CM

## 2021-02-01 DIAGNOSIS — E78.2 MIXED HYPERLIPIDEMIA: ICD-10-CM

## 2021-02-01 DIAGNOSIS — K21.9 GASTROESOPHAGEAL REFLUX DISEASE WITHOUT ESOPHAGITIS: ICD-10-CM

## 2021-02-01 DIAGNOSIS — I10 ESSENTIAL HYPERTENSION: Primary | ICD-10-CM

## 2021-02-01 DIAGNOSIS — C61 PROSTATE CANCER (HCC): ICD-10-CM

## 2021-02-01 DIAGNOSIS — R97.20 ELEVATED PSA: ICD-10-CM

## 2021-02-01 DIAGNOSIS — R35.1 BENIGN PROSTATIC HYPERPLASIA WITH NOCTURIA: ICD-10-CM

## 2021-02-01 PROBLEM — R07.9 CHEST PAIN: Status: RESOLVED | Noted: 2019-04-23 | Resolved: 2021-02-01

## 2021-02-01 PROBLEM — R06.00 DYSPNEA: Status: RESOLVED | Noted: 2017-08-24 | Resolved: 2021-02-01

## 2021-02-01 PROCEDURE — 99213 OFFICE O/P EST LOW 20 MIN: CPT | Performed by: FAMILY MEDICINE

## 2021-02-01 RX ORDER — AMLODIPINE BESYLATE 10 MG/1
10 TABLET ORAL DAILY
Qty: 90 TABLET | Refills: 1 | Status: SHIPPED | OUTPATIENT
Start: 2021-02-01 | End: 2021-08-02 | Stop reason: SDUPTHER

## 2021-02-01 RX ORDER — LISINOPRIL AND HYDROCHLOROTHIAZIDE 20; 12.5 MG/1; MG/1
2 TABLET ORAL DAILY
Qty: 180 TABLET | Refills: 1 | Status: SHIPPED | OUTPATIENT
Start: 2021-02-01 | End: 2021-08-02 | Stop reason: SDUPTHER

## 2021-02-01 RX ORDER — OMEPRAZOLE 20 MG/1
20 CAPSULE, DELAYED RELEASE ORAL DAILY
Qty: 90 CAPSULE | Refills: 1 | Status: SHIPPED | OUTPATIENT
Start: 2021-02-01 | End: 2021-08-02 | Stop reason: SDUPTHER

## 2021-02-01 RX ORDER — ATORVASTATIN CALCIUM 40 MG/1
40 TABLET, FILM COATED ORAL DAILY
Qty: 90 TABLET | Refills: 1 | Status: SHIPPED | OUTPATIENT
Start: 2021-02-01 | End: 2021-08-02 | Stop reason: SDUPTHER

## 2021-02-01 NOTE — PROGRESS NOTES
Chief Complaint   Patient presents with   • Hypertension        Subjective   Sergei Vnace is a 65 y.o.  male who presents today for follow-up of hypertension.  He has been doing well without any adverse effect.  He denies any symptoms associated with side effect of medications.  He did have some chest pressure when taking metoprolol and stopped at the second day.  He has not taken this in some time.  His past medical history is significant for hypertension, hyperlipidemia, prostate cancer, and BPH.  Review of systems is negative for edema or constipation, muscle aches, paresthesias, cough.  Positive for reflux when he stops omeprazole.    Sergei Vance  has a past medical history of Abnormal stress test, Adenomatous polyp (3/23/2020), BPH (benign prostatic hyperplasia), Elevated PSA, GERD (gastroesophageal reflux disease), Hyperlipidemia, Hypertension, Prostate cancer (CMS/HCC), and Renal insufficiency.    No Known Allergies    Current Outpatient Medications:   •  amLODIPine (NORVASC) 10 MG tablet, Take 1 tablet by mouth Daily., Disp: 90 tablet, Rfl: 1  •  lisinopril-hydrochlorothiazide (PRINZIDE,ZESTORETIC) 20-12.5 MG per tablet, Take 2 tablets by mouth Daily., Disp: 180 tablet, Rfl: 1  •  omeprazole (priLOSEC) 20 MG capsule, Take 1 capsule by mouth Daily., Disp: 90 capsule, Rfl: 1  •  atorvastatin (Lipitor) 40 MG tablet, Take 1 tablet by mouth Daily., Disp: 90 tablet, Rfl: 1  Past Medical History:   Diagnosis Date   • Abnormal stress test    • Adenomatous polyp 3/23/2020   • BPH (benign prostatic hyperplasia)    • Elevated PSA    • GERD (gastroesophageal reflux disease)    • Hyperlipidemia    • Hypertension    • Prostate cancer (CMS/HCC)    • Renal insufficiency      Past Surgical History:   Procedure Laterality Date   • VASECTOMY       Social History     Socioeconomic History   • Marital status:      Spouse name: Not on file   • Number of children: Not on file   • Years of education: Not  "on file   • Highest education level: Not on file   Tobacco Use   • Smoking status: Former Smoker     Packs/day: 1.00     Types: Cigarettes     Start date:      Quit date:      Years since quittin.0   • Smokeless tobacco: Former User     Types: Chew     Quit date:    Substance and Sexual Activity   • Alcohol use: Yes     Alcohol/week: 2.0 standard drinks     Types: 2 Cans of beer per week     Frequency: Never   • Drug use: No     Family History   Problem Relation Age of Onset   • No Known Problems Mother    • No Known Problems Father    • Colon cancer Maternal Grandmother    • Prostate cancer Maternal Grandfather      No current outpatient medications on file prior to visit.     No current facility-administered medications on file prior to visit.         Family history, surgical history, past medical history, Allergies and med's reviewed with patient today and updated in [facility] EMR.     ROS:  Review of Systems    OBJECTIVE:  Vitals:    21 1506 21 1519   BP: 153/79 139/70   Pulse: 54    Resp: 16    Temp: 97.1 °F (36.2 °C)    SpO2: 98%    Weight: 116 kg (256 lb)    Height: 177.8 cm (70\")      Body mass index is 36.73 kg/m².  Physical Exam  Constitutional:       General: He is not in acute distress.     Appearance: He is well-developed.   Cardiovascular:      Rate and Rhythm: Normal rate and regular rhythm.      Heart sounds: Normal heart sounds. No murmur.   Pulmonary:      Effort: Pulmonary effort is normal.      Breath sounds: Normal breath sounds. No wheezing or rales.   Abdominal:      General: Bowel sounds are normal. There is no distension.      Palpations: Abdomen is soft.   Musculoskeletal: Normal range of motion.   Skin:     General: Skin is warm and dry.   Neurological:      Mental Status: He is alert and oriented to person, place, and time.   Psychiatric:         Behavior: Behavior normal.         ASSESSMENT/ PLAN:    Diagnoses and all orders for this visit:    1. Essential " hypertension (Primary)  Assessment & Plan:  Stable.  Continue amlodipine and lisinopril/hydrochlorothiazide.  We will recheck blood work to monitor renal function.    Orders:  -     lisinopril-hydrochlorothiazide (PRINZIDE,ZESTORETIC) 20-12.5 MG per tablet; Take 2 tablets by mouth Daily.  Dispense: 180 tablet; Refill: 1    2. Mixed hyperlipidemia  Assessment & Plan:  10-year ASCVD risk is 17%.  We will start high-dose statin therapy.        3. Gastroesophageal reflux disease without esophagitis  Assessment & Plan:  Unable to wean off proton pump inhibitor.  Continue to monitor renal function.      4. Elevated PSA    5. Renal insufficiency, mild    6. Benign prostatic hyperplasia with nocturia    7. Prostate cancer (CMS/HCC)  Assessment & Plan:  Continue urology follow-up.  He is not due for a PSA until the summer.      8. Essential hypertension  Comments:  cont meds  Assessment & Plan:  Stable.  Continue amlodipine and lisinopril/hydrochlorothiazide.  We will recheck blood work to monitor renal function.    Orders:  -     lisinopril-hydrochlorothiazide (PRINZIDE,ZESTORETIC) 20-12.5 MG per tablet; Take 2 tablets by mouth Daily.  Dispense: 180 tablet; Refill: 1    Other orders  -     atorvastatin (Lipitor) 40 MG tablet; Take 1 tablet by mouth Daily.  Dispense: 90 tablet; Refill: 1  -     omeprazole (priLOSEC) 20 MG capsule; Take 1 capsule by mouth Daily.  Dispense: 90 capsule; Refill: 1  -     amLODIPine (NORVASC) 10 MG tablet; Take 1 tablet by mouth Daily.  Dispense: 90 tablet; Refill: 1      Orders Placed Today:     New Medications Ordered This Visit   Medications   • atorvastatin (Lipitor) 40 MG tablet     Sig: Take 1 tablet by mouth Daily.     Dispense:  90 tablet     Refill:  1   • omeprazole (priLOSEC) 20 MG capsule     Sig: Take 1 capsule by mouth Daily.     Dispense:  90 capsule     Refill:  1   • lisinopril-hydrochlorothiazide (PRINZIDE,ZESTORETIC) 20-12.5 MG per tablet     Sig: Take 2 tablets by mouth Daily.      Dispense:  180 tablet     Refill:  1   • amLODIPine (NORVASC) 10 MG tablet     Sig: Take 1 tablet by mouth Daily.     Dispense:  90 tablet     Refill:  1        Management Plan:     An After Visit Summary was printed and given to the patient at discharge.    Follow-up: Return in about 6 months (around 8/1/2021) for Wellness Visit.    Reggie Duane Lyell, MD 2/2/2021 06:12 EST  This note was electronically signed.

## 2021-02-02 NOTE — ASSESSMENT & PLAN NOTE
Stable.  Continue amlodipine and lisinopril/hydrochlorothiazide.  We will recheck blood work to monitor renal function.

## 2021-03-24 RX ORDER — METOPROLOL SUCCINATE 50 MG/1
TABLET, EXTENDED RELEASE ORAL
Qty: 90 TABLET | Refills: 0 | OUTPATIENT
Start: 2021-03-24

## 2021-08-02 ENCOUNTER — OFFICE VISIT (OUTPATIENT)
Dept: FAMILY MEDICINE CLINIC | Facility: CLINIC | Age: 66
End: 2021-08-02

## 2021-08-02 VITALS
TEMPERATURE: 97.7 F | RESPIRATION RATE: 16 BRPM | BODY MASS INDEX: 35.3 KG/M2 | DIASTOLIC BLOOD PRESSURE: 74 MMHG | SYSTOLIC BLOOD PRESSURE: 135 MMHG | WEIGHT: 246.6 LBS | HEART RATE: 54 BPM | HEIGHT: 70 IN | OXYGEN SATURATION: 99 %

## 2021-08-02 DIAGNOSIS — E78.2 MIXED HYPERLIPIDEMIA: ICD-10-CM

## 2021-08-02 DIAGNOSIS — Z00.00 ENCOUNTER FOR PREVENTIVE HEALTH EXAMINATION: Primary | ICD-10-CM

## 2021-08-02 DIAGNOSIS — N40.1 BENIGN PROSTATIC HYPERPLASIA WITH NOCTURIA: ICD-10-CM

## 2021-08-02 DIAGNOSIS — K21.9 GASTROESOPHAGEAL REFLUX DISEASE WITHOUT ESOPHAGITIS: ICD-10-CM

## 2021-08-02 DIAGNOSIS — I10 ESSENTIAL HYPERTENSION: ICD-10-CM

## 2021-08-02 DIAGNOSIS — C61 PROSTATE CANCER (HCC): ICD-10-CM

## 2021-08-02 DIAGNOSIS — N28.9 RENAL INSUFFICIENCY, MILD: ICD-10-CM

## 2021-08-02 DIAGNOSIS — E66.01 MORBIDLY OBESE (HCC): ICD-10-CM

## 2021-08-02 DIAGNOSIS — R35.1 BENIGN PROSTATIC HYPERPLASIA WITH NOCTURIA: ICD-10-CM

## 2021-08-02 PROBLEM — R97.20 ELEVATED PSA: Status: RESOLVED | Noted: 2019-04-24 | Resolved: 2021-08-02

## 2021-08-02 PROBLEM — H10.10 ALLERGIC CONJUNCTIVITIS: Status: ACTIVE | Noted: 2020-11-05

## 2021-08-02 LAB
BILIRUB BLD-MCNC: NEGATIVE MG/DL
CLARITY, POC: CLEAR
COLOR UR: YELLOW
GLUCOSE UR STRIP-MCNC: NEGATIVE MG/DL
KETONES UR QL: NEGATIVE
LEUKOCYTE EST, POC: NEGATIVE
NITRITE UR-MCNC: NEGATIVE MG/ML
PH UR: 6 [PH] (ref 5–8)
PROT UR STRIP-MCNC: NEGATIVE MG/DL
RBC # UR STRIP: NEGATIVE /UL
SP GR UR: 1.03 (ref 1–1.03)
UROBILINOGEN UR QL: NORMAL

## 2021-08-02 PROCEDURE — 81003 URINALYSIS AUTO W/O SCOPE: CPT | Performed by: FAMILY MEDICINE

## 2021-08-02 PROCEDURE — 99397 PER PM REEVAL EST PAT 65+ YR: CPT | Performed by: FAMILY MEDICINE

## 2021-08-02 RX ORDER — OMEPRAZOLE 40 MG/1
40 CAPSULE, DELAYED RELEASE ORAL DAILY
Qty: 90 CAPSULE | Refills: 3 | Status: SHIPPED | OUTPATIENT
Start: 2021-08-02 | End: 2022-08-17

## 2021-08-02 RX ORDER — OMEPRAZOLE 20 MG/1
20 CAPSULE, DELAYED RELEASE ORAL DAILY
Qty: 90 CAPSULE | Refills: 3 | Status: SHIPPED | OUTPATIENT
Start: 2021-08-02 | End: 2021-08-02 | Stop reason: SDUPTHER

## 2021-08-02 RX ORDER — LISINOPRIL AND HYDROCHLOROTHIAZIDE 20; 12.5 MG/1; MG/1
2 TABLET ORAL DAILY
Qty: 180 TABLET | Refills: 3 | Status: SHIPPED | OUTPATIENT
Start: 2021-08-02 | End: 2021-12-08 | Stop reason: SDUPTHER

## 2021-08-02 RX ORDER — AMLODIPINE BESYLATE 10 MG/1
10 TABLET ORAL DAILY
Qty: 90 TABLET | Refills: 3 | Status: SHIPPED | OUTPATIENT
Start: 2021-08-02 | End: 2022-08-11

## 2021-08-02 RX ORDER — ATORVASTATIN CALCIUM 40 MG/1
40 TABLET, FILM COATED ORAL DAILY
Qty: 90 TABLET | Refills: 3 | Status: SHIPPED | OUTPATIENT
Start: 2021-08-02 | End: 2021-12-08 | Stop reason: SDUPTHER

## 2021-08-02 NOTE — ASSESSMENT & PLAN NOTE
Still with reflux after most meals. Will try to increase dose of PPI.  Continue routine renal monitoring.

## 2021-08-02 NOTE — ASSESSMENT & PLAN NOTE
Patient's (Body mass index is 35.38 kg/m².) indicates that they are morbidly obese (BMI > 40 or > 35 with obesity - related health condition) with obesity-related health conditions that include hypertension, dyslipidemias and GERD . Obesity is unchanged. BMI is is above average; BMI management plan is completed. We discussed portion control and increasing exercise.

## 2021-08-02 NOTE — ASSESSMENT & PLAN NOTE
He has some symptoms of an enlarged prostate but also has prostate cancer.  I will defer to urology.

## 2021-08-02 NOTE — PROGRESS NOTES
Chief Complaint   Patient presents with   • Annual Exam        Subjective   Sergei Vance is a 66 y.o.  male who presents today for wellness visit.    Sergei Vance  has a past medical history of Abnormal stress test, Adenomatous polyp (3/23/2020), BPH (benign prostatic hyperplasia), Elevated PSA, GERD (gastroesophageal reflux disease), Hyperlipidemia, Hypertension, Prostate cancer (CMS/HCC), and Renal insufficiency.    No Known Allergies    Current Outpatient Medications:   •  amLODIPine (NORVASC) 10 MG tablet, Take 1 tablet by mouth Daily., Disp: 90 tablet, Rfl: 3  •  atorvastatin (Lipitor) 40 MG tablet, Take 1 tablet by mouth Daily., Disp: 90 tablet, Rfl: 3  •  lisinopril-hydrochlorothiazide (PRINZIDE,ZESTORETIC) 20-12.5 MG per tablet, Take 2 tablets by mouth Daily., Disp: 180 tablet, Rfl: 3  •  omeprazole (priLOSEC) 40 MG capsule, Take 1 capsule by mouth Daily., Disp: 90 capsule, Rfl: 3  Past Medical History:   Diagnosis Date   • Abnormal stress test    • Adenomatous polyp 3/23/2020   • BPH (benign prostatic hyperplasia)    • Elevated PSA    • GERD (gastroesophageal reflux disease)    • Hyperlipidemia    • Hypertension    • Prostate cancer (CMS/HCC)    • Renal insufficiency      Past Surgical History:   Procedure Laterality Date   • VASECTOMY       Social History     Socioeconomic History   • Marital status:      Spouse name: Not on file   • Number of children: Not on file   • Years of education: Not on file   • Highest education level: Not on file   Tobacco Use   • Smoking status: Former Smoker     Packs/day: 1.00     Types: Cigarettes     Start date:      Quit date:      Years since quittin.5   • Smokeless tobacco: Former User     Types: Chew     Quit date:    Vaping Use   • Vaping Use: Never used   Substance and Sexual Activity   • Alcohol use: Yes     Alcohol/week: 2.0 standard drinks     Types: 2 Cans of beer per week   • Drug use: No   • Sexual activity: Defer      Family History   Problem Relation Age of Onset   • No Known Problems Mother    • No Known Problems Father    • Colon cancer Maternal Grandmother    • Prostate cancer Maternal Grandfather      Current Outpatient Medications on File Prior to Visit   Medication Sig Dispense Refill   • [DISCONTINUED] amLODIPine (NORVASC) 10 MG tablet Take 1 tablet by mouth Daily. 90 tablet 1   • [DISCONTINUED] atorvastatin (Lipitor) 40 MG tablet Take 1 tablet by mouth Daily. 90 tablet 1   • [DISCONTINUED] lisinopril-hydrochlorothiazide (PRINZIDE,ZESTORETIC) 20-12.5 MG per tablet Take 2 tablets by mouth Daily. 180 tablet 1   • [DISCONTINUED] omeprazole (priLOSEC) 20 MG capsule Take 1 capsule by mouth Daily. 90 capsule 1     No current facility-administered medications on file prior to visit.        Family history, surgical history, past medical history, Allergies and med's reviewed with patient today and updated in [facility] EMR.     ROS:  Review of Systems   Constitutional: Negative for activity change, appetite change, chills, diaphoresis, fatigue, fever and unexpected weight change.   HENT: Negative for congestion, ear pain, facial swelling, hearing loss, nosebleeds, postnasal drip, rhinorrhea, sinus pressure, sinus pain, sneezing, sore throat, tinnitus and trouble swallowing.    Eyes: Negative for photophobia, pain, discharge, redness, itching and visual disturbance.   Respiratory: Negative for apnea, cough, shortness of breath and wheezing.    Cardiovascular: Negative for chest pain, palpitations and leg swelling.   Gastrointestinal: Negative for abdominal distention, abdominal pain, blood in stool, constipation, diarrhea, nausea, rectal pain and vomiting.   Endocrine: Negative for cold intolerance, heat intolerance, polydipsia, polyphagia and polyuria.   Genitourinary: Negative for decreased urine volume, difficulty urinating, dysuria, enuresis, flank pain, frequency, genital sores, hematuria, penile pain, penile swelling, scrotal  "swelling, testicular pain and urgency.   Musculoskeletal: Negative for arthralgias, back pain, myalgias and neck pain.   Skin: Negative for rash.   Allergic/Immunologic: Negative for environmental allergies and food allergies.   Neurological: Negative for dizziness, tremors, seizures, syncope, facial asymmetry, speech difficulty, weakness, light-headedness, numbness and headaches.   Hematological: Negative for adenopathy. Does not bruise/bleed easily.   Psychiatric/Behavioral: Negative for agitation, confusion, dysphoric mood, sleep disturbance and suicidal ideas. The patient is not nervous/anxious and is not hyperactive.        OBJECTIVE:  Vitals:    08/02/21 0957 08/02/21 1019   BP: 145/88 135/74   BP Location: Right arm    Patient Position: Sitting    Cuff Size: Large Adult    Pulse: 54    Resp: 16    Temp: 97.7 °F (36.5 °C)    TempSrc: Infrared    SpO2: 99%    Weight: 112 kg (246 lb 9.6 oz)    Height: 177.8 cm (70\")      Body mass index is 35.38 kg/m².  Physical Exam  Constitutional:       Appearance: He is well-developed.   HENT:      Head: Normocephalic and atraumatic.   Eyes:      General: No scleral icterus.        Right eye: No discharge.         Left eye: No discharge.      Conjunctiva/sclera: Conjunctivae normal.      Pupils: Pupils are equal, round, and reactive to light.   Neck:      Thyroid: No thyromegaly.   Cardiovascular:      Rate and Rhythm: Normal rate and regular rhythm.      Heart sounds: Normal heart sounds. No murmur heard.   No friction rub. No gallop.    Pulmonary:      Effort: Pulmonary effort is normal. No respiratory distress.      Breath sounds: Normal breath sounds. No wheezing or rales.   Abdominal:      General: Bowel sounds are normal. There is no distension.      Palpations: Abdomen is soft. There is no mass.      Tenderness: There is no abdominal tenderness. There is no guarding or rebound.   Musculoskeletal:         General: No tenderness or deformity. Normal range of motion. "   Lymphadenopathy:      Cervical: No cervical adenopathy.   Skin:     General: Skin is warm and dry.      Findings: No erythema or rash.   Neurological:      Mental Status: He is alert and oriented to person, place, and time.      Cranial Nerves: No cranial nerve deficit.      Sensory: No sensory deficit.      Motor: No abnormal muscle tone.      Coordination: Coordination normal.      Deep Tendon Reflexes: Reflexes normal.   Psychiatric:         Behavior: Behavior normal.         Thought Content: Thought content normal.         Judgment: Judgment normal.         ASSESSMENT/ PLAN:    Diagnoses and all orders for this visit:    1. Encounter for preventive health examination (Primary)  Assessment & Plan:  Updated annual wellness visit checklist.  Immunizations up-to-date.  Screening up-to-date.  Recommend yearly dental and eye exams. Also discussed monitoring of blood pressure and lipids.    Orders:  -     POC Urinalysis Dipstick, Automated    2. Mixed hyperlipidemia  Assessment & Plan:  Recheck blood work and call with results.  Continue atorvastatin.      3. Essential hypertension  Assessment & Plan:  Doing well.  Continue lisinopril hydrochlorothiazide and amlodipine.    Orders:  -     lisinopril-hydrochlorothiazide (PRINZIDE,ZESTORETIC) 20-12.5 MG per tablet; Take 2 tablets by mouth Daily.  Dispense: 180 tablet; Refill: 3    4. Gastroesophageal reflux disease without esophagitis  Assessment & Plan:  Still with reflux after most meals. Will try to increase dose of PPI.  Continue routine renal monitoring.      5. Benign prostatic hyperplasia with nocturia  Assessment & Plan:  He has some symptoms of an enlarged prostate but also has prostate cancer.  I will defer to urology.      6. Renal insufficiency, mild  Assessment & Plan:  Recheck blood work and call with results.      7. Prostate cancer (CMS/[facility])  Assessment & Plan:  Recently completed radiation.  Continue urology follow-up.      8. Essential  hypertension  Comments:  cont meds  Assessment & Plan:  Doing well.  Continue lisinopril hydrochlorothiazide and amlodipine.    Orders:  -     lisinopril-hydrochlorothiazide (PRINZIDE,ZESTORETIC) 20-12.5 MG per tablet; Take 2 tablets by mouth Daily.  Dispense: 180 tablet; Refill: 3    Other orders  -     Discontinue: omeprazole (priLOSEC) 20 MG capsule; Take 1 capsule by mouth Daily.  Dispense: 90 capsule; Refill: 3  -     atorvastatin (Lipitor) 40 MG tablet; Take 1 tablet by mouth Daily.  Dispense: 90 tablet; Refill: 3  -     amLODIPine (NORVASC) 10 MG tablet; Take 1 tablet by mouth Daily.  Dispense: 90 tablet; Refill: 3  -     omeprazole (priLOSEC) 40 MG capsule; Take 1 capsule by mouth Daily.  Dispense: 90 capsule; Refill: 3      Orders Placed Today:     New Medications Ordered This Visit   Medications   • lisinopril-hydrochlorothiazide (PRINZIDE,ZESTORETIC) 20-12.5 MG per tablet     Sig: Take 2 tablets by mouth Daily.     Dispense:  180 tablet     Refill:  3   • atorvastatin (Lipitor) 40 MG tablet     Sig: Take 1 tablet by mouth Daily.     Dispense:  90 tablet     Refill:  3   • amLODIPine (NORVASC) 10 MG tablet     Sig: Take 1 tablet by mouth Daily.     Dispense:  90 tablet     Refill:  3   • omeprazole (priLOSEC) 40 MG capsule     Sig: Take 1 capsule by mouth Daily.     Dispense:  90 capsule     Refill:  3        Management Plan:     An After Visit Summary was printed and given to the patient at discharge.    Follow-up: Return in about 1 year (around 8/2/2022) for Wellness Visit.    Reggie Duane Lyell, MD 8/2/2021 11:14 EDT  This note was electronically signed.

## 2021-08-09 DIAGNOSIS — I10 ESSENTIAL HYPERTENSION: ICD-10-CM

## 2021-08-09 DIAGNOSIS — E78.2 MIXED HYPERLIPIDEMIA: ICD-10-CM

## 2021-08-09 DIAGNOSIS — Z12.5 SCREENING PSA (PROSTATE SPECIFIC ANTIGEN): ICD-10-CM

## 2021-08-09 DIAGNOSIS — I10 ESSENTIAL HYPERTENSION: Primary | ICD-10-CM

## 2021-08-10 LAB
ALBUMIN SERPL-MCNC: 3.9 G/DL (ref 3.8–4.8)
ALBUMIN/GLOB SERPL: 1.5 {RATIO} (ref 1.2–2.2)
ALP SERPL-CCNC: 88 IU/L (ref 48–121)
ALT SERPL-CCNC: 19 IU/L (ref 0–44)
AST SERPL-CCNC: 19 IU/L (ref 0–40)
BASOPHILS # BLD AUTO: 0.1 X10E3/UL (ref 0–0.2)
BASOPHILS NFR BLD AUTO: 1 %
BILIRUB SERPL-MCNC: 0.6 MG/DL (ref 0–1.2)
BUN SERPL-MCNC: 12 MG/DL (ref 8–27)
BUN/CREAT SERPL: 11 (ref 10–24)
CALCIUM SERPL-MCNC: 9.3 MG/DL (ref 8.6–10.2)
CHLORIDE SERPL-SCNC: 105 MMOL/L (ref 96–106)
CHOLEST SERPL-MCNC: 141 MG/DL (ref 100–199)
CO2 SERPL-SCNC: 25 MMOL/L (ref 20–29)
CREAT SERPL-MCNC: 1.11 MG/DL (ref 0.76–1.27)
EOSINOPHIL # BLD AUTO: 0.4 X10E3/UL (ref 0–0.4)
EOSINOPHIL NFR BLD AUTO: 6 %
ERYTHROCYTE [DISTWIDTH] IN BLOOD BY AUTOMATED COUNT: 13.1 % (ref 11.6–15.4)
GLOBULIN SER CALC-MCNC: 2.6 G/DL (ref 1.5–4.5)
GLUCOSE SERPL-MCNC: 104 MG/DL (ref 65–99)
HCT VFR BLD AUTO: 45.4 % (ref 37.5–51)
HDLC SERPL-MCNC: 37 MG/DL
HGB BLD-MCNC: 15.2 G/DL (ref 13–17.7)
IMM GRANULOCYTES # BLD AUTO: 0 X10E3/UL (ref 0–0.1)
IMM GRANULOCYTES NFR BLD AUTO: 0 %
LDLC SERPL CALC-MCNC: 88 MG/DL (ref 0–99)
LYMPHOCYTES # BLD AUTO: 0.9 X10E3/UL (ref 0.7–3.1)
LYMPHOCYTES NFR BLD AUTO: 14 %
MCH RBC QN AUTO: 30.2 PG (ref 26.6–33)
MCHC RBC AUTO-ENTMCNC: 33.5 G/DL (ref 31.5–35.7)
MCV RBC AUTO: 90 FL (ref 79–97)
MONOCYTES # BLD AUTO: 0.8 X10E3/UL (ref 0.1–0.9)
MONOCYTES NFR BLD AUTO: 12 %
NEUTROPHILS # BLD AUTO: 4.3 X10E3/UL (ref 1.4–7)
NEUTROPHILS NFR BLD AUTO: 67 %
PLATELET # BLD AUTO: 238 X10E3/UL (ref 150–450)
POTASSIUM SERPL-SCNC: 4.7 MMOL/L (ref 3.5–5.2)
PROT SERPL-MCNC: 6.5 G/DL (ref 6–8.5)
PSA SERPL-MCNC: 1.4 NG/ML (ref 0–4)
RBC # BLD AUTO: 5.03 X10E6/UL (ref 4.14–5.8)
SODIUM SERPL-SCNC: 143 MMOL/L (ref 134–144)
TRIGL SERPL-MCNC: 85 MG/DL (ref 0–149)
VLDLC SERPL CALC-MCNC: 16 MG/DL (ref 5–40)
WBC # BLD AUTO: 6.4 X10E3/UL (ref 3.4–10.8)

## 2021-08-11 ENCOUNTER — TELEPHONE (OUTPATIENT)
Dept: FAMILY MEDICINE CLINIC | Facility: CLINIC | Age: 66
End: 2021-08-11

## 2021-08-11 NOTE — TELEPHONE ENCOUNTER
PATIENT CALLED RETURNING A CALL TO DISCUSS LAB RESULTS. NOT ABLE TO WARM TRANSFER TO THE OFFICE.    PLEASE CALL BACK    [phone]

## 2021-11-08 ENCOUNTER — OFFICE VISIT (OUTPATIENT)
Dept: FAMILY MEDICINE CLINIC | Facility: CLINIC | Age: 66
End: 2021-11-08

## 2021-11-08 VITALS
OXYGEN SATURATION: 99 % | SYSTOLIC BLOOD PRESSURE: 179 MMHG | WEIGHT: 256 LBS | DIASTOLIC BLOOD PRESSURE: 101 MMHG | HEART RATE: 63 BPM | BODY MASS INDEX: 36.65 KG/M2 | RESPIRATION RATE: 18 BRPM | TEMPERATURE: 97.4 F | HEIGHT: 70 IN

## 2021-11-08 DIAGNOSIS — N28.9 RENAL INSUFFICIENCY, MILD: ICD-10-CM

## 2021-11-08 DIAGNOSIS — R42 DIZZINESS: ICD-10-CM

## 2021-11-08 DIAGNOSIS — R94.31 ABNORMAL EKG: ICD-10-CM

## 2021-11-08 DIAGNOSIS — R51.9 NONINTRACTABLE HEADACHE, UNSPECIFIED CHRONICITY PATTERN, UNSPECIFIED HEADACHE TYPE: ICD-10-CM

## 2021-11-08 DIAGNOSIS — I10 PRIMARY HYPERTENSION: ICD-10-CM

## 2021-11-08 DIAGNOSIS — R00.1 BRADYCARDIA: Primary | ICD-10-CM

## 2021-11-08 DIAGNOSIS — E78.2 MIXED HYPERLIPIDEMIA: ICD-10-CM

## 2021-11-08 PROCEDURE — 93000 ELECTROCARDIOGRAM COMPLETE: CPT | Performed by: NURSE PRACTITIONER

## 2021-11-08 PROCEDURE — 99214 OFFICE O/P EST MOD 30 MIN: CPT | Performed by: NURSE PRACTITIONER

## 2021-11-08 NOTE — PROGRESS NOTES
Chief Complaint  Dizziness    Subjective          Sergei Vance presents to [facility] for dizziness    History of Present Illness      Patient is here complaining of dizziness that started 3 nights ago.  He reports that last summer he had a period like this and went to the emergency room and was dehydrated.  He has been working double shifts and is doing that again this weekend.  He went to [facility] ER in September with some dizziness and was offered admission secondary to a low heart rate.  He declined that and was to follow-up here for additional evaluation.  He did not follow-up here until today when the symptoms started again a few days ago.  Patient also stopped his blood pressure medicine after having the dizziness when he got home from work on Friday night.  He has not been to hospital for evaluation.  He has no chest pain or shortness of breath.  In 2019 he had an evaluation by a stress test for chest pain and referral to cardiology.  Patient denies chest pain or shortness of breath.  He reports he is dizzy sometimes when he gets up.  He also reports that he now has a headache.  He has had some change in his vision over the last few months as well and was going to the eye doctor secondary to the vision changes.  He has no other neurologic symptoms.    Sergei Vance  has a past medical history of Abnormal stress test, Adenomatous polyp (3/23/2020), BPH (benign prostatic hyperplasia), Elevated PSA, GERD (gastroesophageal reflux disease), Hyperlipidemia, Hypertension, Prostate cancer (HCC), and Renal insufficiency.      Review of Systems   Constitutional: Positive for fatigue. Negative for fever.   Respiratory: Negative for cough and shortness of breath.    Cardiovascular: Negative for chest pain and palpitations.   Gastrointestinal: Negative for abdominal pain.   Neurological: Positive for dizziness and headaches.        Objective       Current Outpatient  "Medications:   •  amLODIPine (NORVASC) 10 MG tablet, Take 1 tablet by mouth Daily., Disp: 90 tablet, Rfl: 3  •  atorvastatin (Lipitor) 40 MG tablet, Take 1 tablet by mouth Daily., Disp: 90 tablet, Rfl: 3  •  lisinopril-hydrochlorothiazide (PRINZIDE,ZESTORETIC) 20-12.5 MG per tablet, Take 2 tablets by mouth Daily., Disp: 180 tablet, Rfl: 3  •  omeprazole (priLOSEC) 40 MG capsule, Take 1 capsule by mouth Daily., Disp: 90 capsule, Rfl: 3    Vital Signs:      BP (!) 179/101 (BP Location: Left arm, Patient Position: Sitting, Cuff Size: Large Adult)   Pulse 63   Temp 97.4 °F (36.3 °C) (Infrared)   Resp 18   Ht 177.8 cm (70\")   Wt 116 kg (256 lb)   SpO2 99%   BMI 36.73 kg/m²     Vitals:    11/08/21 1121   BP: (!) 179/101   BP Location: Left arm   Patient Position: Sitting   Cuff Size: Large Adult   Pulse: 63   Resp: 18   Temp: 97.4 °F (36.3 °C)   TempSrc: Infrared   SpO2: 99%   Weight: 116 kg (256 lb)   Height: 177.8 cm (70\")      Physical Exam  Vitals and nursing note reviewed.   Constitutional:       General: He is not in acute distress.     Appearance: Normal appearance. He is well-developed and normal weight.   HENT:      Head: Normocephalic and atraumatic.      Right Ear: Tympanic membrane, ear canal and external ear normal.      Left Ear: Tympanic membrane, ear canal and external ear normal.      Nose: Nose normal.      Mouth/Throat:      Mouth: Mucous membranes are moist.      Dentition: Normal dentition.      Tongue: No lesions.      Pharynx: Uvula midline.   Eyes:      Conjunctiva/sclera: Conjunctivae normal.      Pupils: Pupils are equal, round, and reactive to light.   Cardiovascular:      Rate and Rhythm: Normal rate and regular rhythm.      Heart sounds: Normal heart sounds. No murmur heard.  No friction rub. No gallop.    Pulmonary:      Effort: Pulmonary effort is normal.      Breath sounds: Normal breath sounds. No wheezing, rhonchi or rales.   Abdominal:      General: Bowel sounds are normal. There " is no distension.      Palpations: Abdomen is soft.      Tenderness: There is no abdominal tenderness.   Musculoskeletal:         General: Normal range of motion.      Cervical back: Normal range of motion and neck supple.   Skin:     General: Skin is warm and dry.   Neurological:      General: No focal deficit present.      Mental Status: He is alert.      Cranial Nerves: No cranial nerve deficit.   Psychiatric:         Mood and Affect: Mood normal.         Behavior: Behavior normal.        Result Review :            ECG 12 Lead    Date/Time: 11/8/2021 3:06 PM  Performed by: Alpa Valderrama APRN  Authorized by: Alpa Valderrama APRN   Comparison: compared with previous ECG from 9/8/2021  Similar to previous ECG  Rhythm: sinus bradycardia  Rate: bradycardic  Conduction: non-specific intraventricular conduction delay  ST Segments: ST segments normal  T Waves: T waves normal  Other: no other findings    Clinical impression: non-specific ECG             PHQ-9 Depression Screening  Little interest or pleasure in doing things?     Feeling down, depressed, or hopeless?     Trouble falling or staying asleep, or sleeping too much?     Feeling tired or having little energy?     Poor appetite or overeating?     Feeling bad about yourself - or that you are a failure or have let yourself or your family down?     Trouble concentrating on things, such as reading the newspaper or watching television?     Moving or speaking so slowly that other people could have noticed? Or the opposite - being so fidgety or restless that you have been moving around a lot more than usual?     Thoughts that you would be better off dead, or of hurting yourself in some way?     PHQ-9 Total Score     If you checked off any problems, how difficult have these problems made it for you to do your work, take care of things at home, or get along with other people?        Discussed if any increasing symptoms to go to ED     Assessment and Plan    Diagnoses  and all orders for this visit:    1. Bradycardia (Primary)  Comments:  Call made to cardiology to schedule appointment as soon as possible.    Orders:  -     Holter monitor - 24 hour; Future  -     Ambulatory Referral to Cardiology    2. Mixed hyperlipidemia    3. Renal insufficiency, mild  -     Comprehensive Metabolic Panel    4. Dizziness  -     CT Head Without Contrast; Future  -     CBC & Differential  -     Comprehensive Metabolic Panel  -     TSH  -     Duplex Carotid Ultrasound CAR; Future  -     Holter monitor - 24 hour; Future  -     Ambulatory Referral to Cardiology    5. Nonintractable headache, unspecified chronicity pattern, unspecified headache type  -     CT Head Without Contrast; Future    6. Abnormal EKG  -     Ambulatory Referral to Cardiology    7. Primary hypertension  Assessment & Plan:  Hypertension is worsening.  Continue current treatment regimen.  Blood pressure will be reassessed at the next regular appointment.  Patient advised to start back on blood pressure medication.         Problem List Items Addressed This Visit        Active Problems    Hyperlipidemia    Hypertension    Current Assessment & Plan     Hypertension is worsening.  Continue current treatment regimen.  Blood pressure will be reassessed at the next regular appointment.  Patient advised to start back on blood pressure medication.         Renal insufficiency, mild    Relevant Orders    Comprehensive Metabolic Panel      Other Visit Diagnoses     Bradycardia    -  Primary    Call made to cardiology to schedule appointment as soon as possible.      Relevant Orders    Holter monitor - 24 hour    Ambulatory Referral to Cardiology    Dizziness        Relevant Orders    CT Head Without Contrast    CBC & Differential    Comprehensive Metabolic Panel    TSH    Duplex Carotid Ultrasound CAR    Holter monitor - 24 hour    Ambulatory Referral to Cardiology    Nonintractable headache, unspecified chronicity pattern, unspecified  headache type        Relevant Orders    CT Head Without Contrast    Abnormal EKG        Relevant Orders    Ambulatory Referral to Cardiology          Follow Up   Return in about 2 weeks (around 11/22/2021) for Recheck.  Patient was given instructions and counseling regarding his condition or for health maintenance advice. Please see specific information pulled into the AVS if appropriate.

## 2021-11-08 NOTE — ASSESSMENT & PLAN NOTE
Hypertension is worsening.  Continue current treatment regimen.  Blood pressure will be reassessed at the next regular appointment.  Patient advised to start back on blood pressure medication.

## 2021-11-12 ENCOUNTER — OFFICE VISIT (OUTPATIENT)
Dept: CARDIOLOGY | Facility: CLINIC | Age: 66
End: 2021-11-12

## 2021-11-12 VITALS
HEIGHT: 70 IN | DIASTOLIC BLOOD PRESSURE: 92 MMHG | SYSTOLIC BLOOD PRESSURE: 170 MMHG | WEIGHT: 249 LBS | BODY MASS INDEX: 35.65 KG/M2 | HEART RATE: 72 BPM | OXYGEN SATURATION: 98 %

## 2021-11-12 DIAGNOSIS — I10 BENIGN ESSENTIAL HTN: ICD-10-CM

## 2021-11-12 DIAGNOSIS — E78.2 MIXED HYPERLIPIDEMIA: ICD-10-CM

## 2021-11-12 DIAGNOSIS — R55 NEAR SYNCOPE: Primary | ICD-10-CM

## 2021-11-12 DIAGNOSIS — R00.2 PALPITATIONS: ICD-10-CM

## 2021-11-12 PROCEDURE — 99214 OFFICE O/P EST MOD 30 MIN: CPT | Performed by: NURSE PRACTITIONER

## 2021-11-12 PROCEDURE — 93000 ELECTROCARDIOGRAM COMPLETE: CPT | Performed by: NURSE PRACTITIONER

## 2021-11-12 RX ORDER — HYDROCODONE BITARTRATE AND ACETAMINOPHEN 5; 325 MG/1; MG/1
TABLET ORAL AS NEEDED
COMMUNITY
Start: 2021-11-02 | End: 2022-01-14

## 2021-11-13 PROBLEM — R42 DIZZINESS: Status: ACTIVE | Noted: 2021-11-13

## 2021-11-13 PROBLEM — R00.2 PALPITATIONS: Status: ACTIVE | Noted: 2021-11-13

## 2021-11-13 NOTE — PROGRESS NOTES
Cardiology Office Follow Up Visit      Primary Care Provider:  Lyell, Reggie Duane, MD    Reason for f/u:     Near syncope  Palpitations  Hypertension  Dyslipidemia      Subjective     CC:    Complains of palpitations associated with near syncope and lightheadedness.      History of Present Illness       Sergei Vance is a 66 y.o. male.  Patient comes to the office today for evaluation of recent episodes of near syncope that have happened on several occasions associated with feelings of dizziness lightheadedness and palpitations including feeling his heart beating hard.    The patient's past medical history is significant for hypertension and dyslipidemia.    Patient was previously evaluated by Dr. Wes grossman in 2019 following a stress test.  He had a stress Myoview that showed no reversible ischemia there is a fixed inferior wall perfusion defect.  At that time the patient exercised for 10 minutes on the treadmill    The patient denies chest pain that limits his activity but does report some activity intolerance due to shortness of breath at times.    He is a tobacco smoker.    He works for [employer].  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    He describes at least several episodes where he begins to feel dizzy and lightheaded and feels like he may pass out.  It associated with this is feelings of palpitations like his heart is beating funny.      Past Medical History:   Diagnosis Date   • Abnormal stress test    • Adenomatous polyp 3/23/2020   • BPH (benign prostatic hyperplasia)    • Elevated PSA    • GERD (gastroesophageal reflux disease)    • Hyperlipidemia    • Hypertension    • Prostate cancer (HCC)    • Renal insufficiency        Past Surgical History:   Procedure Laterality Date   • VASECTOMY           Current Outpatient Medications:   •  amLODIPine (NORVASC) 10 MG tablet, Take 1 tablet by mouth Daily., Disp: 90 tablet, Rfl: 3  •  atorvastatin (Lipitor) 40 MG tablet, Take 1 tablet by mouth  Daily., Disp: 90 tablet, Rfl: 3  •  lisinopril-hydrochlorothiazide (PRINZIDE,ZESTORETIC) 20-12.5 MG per tablet, Take 2 tablets by mouth Daily., Disp: 180 tablet, Rfl: 3  •  omeprazole (priLOSEC) 40 MG capsule, Take 1 capsule by mouth Daily., Disp: 90 capsule, Rfl: 3  •  HYDROcodone-acetaminophen (NORCO) 5-325 MG per tablet, As Needed., Disp: , Rfl:     Social History     Socioeconomic History   • Marital status:    Tobacco Use   • Smoking status: Former Smoker     Packs/day: 1.00     Types: Cigarettes     Start date:      Quit date:      Years since quittin.8   • Smokeless tobacco: Former User     Types: Chew     Quit date:    Vaping Use   • Vaping Use: Never used   Substance and Sexual Activity   • Alcohol use: Yes     Alcohol/week: 2.0 standard drinks     Types: 2 Cans of beer per week   • Drug use: No   • Sexual activity: Defer       Family History   Problem Relation Age of Onset   • Arrhythmia Mother    • No Known Problems Father    • Colon cancer Maternal Grandmother    • Prostate cancer Maternal Grandfather        The following portions of the patient's history were reviewed and updated as appropriate: allergies, current medications, past family history, past medical history, past social history, past surgical history and problem list.    Review of Systems   Constitutional: Negative for decreased appetite and diaphoresis.   HENT: Negative for congestion, hearing loss and nosebleeds.    Cardiovascular: Positive for dyspnea on exertion, irregular heartbeat and palpitations. Negative for chest pain, claudication, leg swelling, near-syncope, orthopnea, paroxysmal nocturnal dyspnea and syncope.   Respiratory: Negative for cough, shortness of breath and sleep disturbances due to breathing.    Endocrine: Negative for polyuria.   Hematologic/Lymphatic: Does not bruise/bleed easily.   Skin: Negative for itching and rash.   Musculoskeletal: Negative for back pain, muscle weakness and myalgias.  "  Gastrointestinal: Negative for abdominal pain, change in bowel habit and nausea.   Genitourinary: Negative for dysuria, flank pain, frequency and hesitancy.   Neurological: Positive for dizziness and light-headedness. Negative for tremors and weakness.   Psychiatric/Behavioral: Negative for altered mental status. The patient does not have insomnia.      /92   Pulse 72   Ht 177.8 cm (70\")   Wt 113 kg (249 lb)   SpO2 98%   BMI 35.73 kg/m² .  Objective     Vitals reviewed.   Constitutional:       General: Not in acute distress.     Appearance: Normal appearance. Well-developed.   Eyes:      Pupils: Pupils are equal, round, and reactive to light.   HENT:      Head: Normocephalic and atraumatic.   Neck:      Vascular: No JVD.   Pulmonary:      Effort: Pulmonary effort is normal.      Breath sounds: Normal breath sounds.   Cardiovascular:      Normal rate. Regular rhythm.   Pulses:     Intact distal pulses.   Edema:     Peripheral edema absent.   Abdominal:      General: There is no distension.      Palpations: Abdomen is soft.      Tenderness: There is no abdominal tenderness.   Musculoskeletal: Normal range of motion.      Cervical back: Normal range of motion and neck supple. Skin:     General: Skin is warm and dry.   Neurological:      Mental Status: Alert and oriented to person, place, and time.             ECG 12 Lead    Date/Time: 11/12/2021 12:23 PM  Performed by: Rafaela Miller APRN  Authorized by: Rafaela Miller APRN   Comparison: not compared with previous ECG   Rhythm: sinus rhythm    Clinical impression: non-specific ECG            EKG ordered by and reviewed by me in office         Diagnoses and all orders for this visit:    1. Near syncope (Primary)  Comments:  Patient reporting episodes of near syncope that have occurred on 2 occasions  Orders:  -     Holter Monitor - 24 Hour; Future  -     Adult Transthoracic Echo Complete W/ Cont if Necessary Per Protocol; Future  -     Stress Test " With Myocardial Perfusion One Day; Future    2. Benign essential HTN  Comments:  Treated with lisinopril with HCTZ and amlodipine    3. Mixed hyperlipidemia  Comments:  Treated with statin    4. Palpitations  Comments:  Associated with feelings of his heart beating hard and sometimes irregularly.           MEDICAL DECISION MAKING:           Patient is here today for due to recent episodes of near syncope, dizziness and lightheadedness, palpitations and dyspnea with exertion.    The patient had a previous stress Myoview that suggested a fixed inferior wall defect with no reversible ischemia.    The patient is continuing to have some episodes of near syncope.  And at times dyspnea with exertion.  Cardiovascular risk factors include hypertension, dyslipidemia, tobacco abuse, family history of CAD.    His blood pressure is mildly elevated here in our office today but when I rechecked it myself in the office was 138/80 with a large size cuff.    I have asked the patient to log his blood pressures at home and also monitor his heart rate and document that for us as well.    I placed a 24-hour Holter monitor to assess his rhythm and rule out any arrhythmias.    In addition I would like to repeat a stress Myoview to rule out an ischemic burden and do an echocardiogram to assess his LV function and rule out structural heart disease in light of his palpitations and near syncope.    The patient will be scheduled for follow-up back here with Dr. Guardado after these tests are complete.    Patient's been counseled regarding tobacco cessation

## 2021-11-22 ENCOUNTER — OFFICE VISIT (OUTPATIENT)
Dept: FAMILY MEDICINE CLINIC | Facility: CLINIC | Age: 66
End: 2021-11-22

## 2021-11-22 VITALS
HEIGHT: 70 IN | HEART RATE: 60 BPM | WEIGHT: 257 LBS | SYSTOLIC BLOOD PRESSURE: 135 MMHG | TEMPERATURE: 97.2 F | DIASTOLIC BLOOD PRESSURE: 84 MMHG | BODY MASS INDEX: 36.79 KG/M2 | OXYGEN SATURATION: 97 % | RESPIRATION RATE: 18 BRPM

## 2021-11-22 DIAGNOSIS — R42 DIZZINESS: ICD-10-CM

## 2021-11-22 DIAGNOSIS — R00.2 PALPITATIONS: ICD-10-CM

## 2021-11-22 DIAGNOSIS — R00.1 BRADYCARDIA: ICD-10-CM

## 2021-11-22 DIAGNOSIS — N28.9 RENAL INSUFFICIENCY, MILD: ICD-10-CM

## 2021-11-22 DIAGNOSIS — I10 BENIGN ESSENTIAL HTN: Primary | ICD-10-CM

## 2021-11-22 PROCEDURE — 99213 OFFICE O/P EST LOW 20 MIN: CPT | Performed by: NURSE PRACTITIONER

## 2021-11-22 NOTE — PROGRESS NOTES
Chief Complaint  Hypertension    Subjective          Sergei Vance presents to [facility] for hypertension and bradycardia    History of Present Illness      Patient is here to follow-up after some periods of dizziness that were occurring for him a few weeks ago and a headache.  He thought that his blood pressure medicine was causing it so he stopped it.  His blood pressure was extremely high when he came in here on 8 November.  He started back on his blood pressure medicine and found to have bradycardia.  He not get his labs done that were ordered.  He has seen cardiology and is having a full cardiac work-up for the bradycardia and possible associated dizziness.  He reports he feels better at the current time.        Sergei Vance  has a past medical history of Abnormal stress test, Adenomatous polyp (3/23/2020), BPH (benign prostatic hyperplasia), Elevated PSA, GERD (gastroesophageal reflux disease), Hyperlipidemia, Hypertension, Prostate cancer (HCC), and Renal insufficiency.      Review of Systems   Constitutional: Negative for fatigue and fever.   Respiratory: Negative for cough and shortness of breath.    Cardiovascular: Negative for chest pain and palpitations.   Gastrointestinal: Negative for abdominal pain.   Neurological: Negative for dizziness and headaches.        Objective       Current Outpatient Medications:   •  amLODIPine (NORVASC) 10 MG tablet, Take 1 tablet by mouth Daily., Disp: 90 tablet, Rfl: 3  •  atorvastatin (Lipitor) 40 MG tablet, Take 1 tablet by mouth Daily., Disp: 90 tablet, Rfl: 3  •  HYDROcodone-acetaminophen (NORCO) 5-325 MG per tablet, As Needed., Disp: , Rfl:   •  lisinopril-hydrochlorothiazide (PRINZIDE,ZESTORETIC) 20-12.5 MG per tablet, Take 2 tablets by mouth Daily., Disp: 180 tablet, Rfl: 3  •  omeprazole (priLOSEC) 40 MG capsule, Take 1 capsule by mouth Daily., Disp: 90 capsule, Rfl: 3    Vital Signs:      /84 (BP Location: Left  "arm, Patient Position: Sitting, Cuff Size: Large Adult)   Pulse 60   Temp 97.2 °F (36.2 °C) (Infrared)   Resp 18   Ht 177.8 cm (70\")   Wt 117 kg (257 lb)   SpO2 97%   BMI 36.88 kg/m²     Vitals:    11/22/21 1021   BP: 135/84   BP Location: Left arm   Patient Position: Sitting   Cuff Size: Large Adult   Pulse: 60   Resp: 18   Temp: 97.2 °F (36.2 °C)   TempSrc: Infrared   SpO2: 97%   Weight: 117 kg (257 lb)   Height: 177.8 cm (70\")      Physical Exam  Vitals and nursing note reviewed.   Constitutional:       Appearance: He is well-developed.   Cardiovascular:      Rate and Rhythm: Normal rate and regular rhythm.      Heart sounds: Normal heart sounds. No murmur heard.  No friction rub. No gallop.    Pulmonary:      Effort: Pulmonary effort is normal.      Breath sounds: Normal breath sounds. No wheezing or rales.   Abdominal:      General: Bowel sounds are normal.      Palpations: Abdomen is soft.      Tenderness: There is no abdominal tenderness.   Skin:     General: Skin is warm and dry.   Neurological:      Mental Status: He is alert.        Result Review :                PHQ-9 Depression Screening  Little interest or pleasure in doing things?     Feeling down, depressed, or hopeless?     Trouble falling or staying asleep, or sleeping too much?     Feeling tired or having little energy?     Poor appetite or overeating?     Feeling bad about yourself - or that you are a failure or have let yourself or your family down?     Trouble concentrating on things, such as reading the newspaper or watching television?     Moving or speaking so slowly that other people could have noticed? Or the opposite - being so fidgety or restless that you have been moving around a lot more than usual?     Thoughts that you would be better off dead, or of hurting yourself in some way?     PHQ-9 Total Score     If you checked off any problems, how difficult have these problems made it for you to do your work, take care of things at " home, or get along with other people?             Assessment and Plan    Diagnoses and all orders for this visit:    1. Benign essential HTN (Primary)  Comments:  Blood pressure is normalized back on his medication.  Continue follow-ups as previously directed.    2. Palpitations  Comments:  Associated with dizziness.  Patient is undergoing cardiac evaluation.    3. Bradycardia  Comments:  Stable for now continue cardiology follow-up and testing.  Patient has multiple questions regarding cardiac testing.    4. Renal insufficiency, mild  Comments:  Patient instructed to get labs that were ordered last time.    5. Dizziness  Comments:  Stabilized for now.  Patient is to follow-up after cardiac work-up or sooner if persistent symptoms of dizziness.  Needs labs to be done that were ordered.  Joe         Problem List Items Addressed This Visit        Active Problems    Benign essential HTN - Primary    Renal insufficiency, mild    Dizziness    Palpitations      Other Visit Diagnoses     Bradycardia        Stable for now continue cardiology follow-up and testing.  Patient has multiple questions regarding cardiac testing.          Follow Up   Return if symptoms worsen or fail to improve.  Patient was given instructions and counseling regarding his condition or for health maintenance advice. Please see specific information pulled into the AVS if appropriate.

## 2021-11-23 LAB
ALBUMIN SERPL-MCNC: 4 G/DL (ref 3.8–4.8)
ALBUMIN/GLOB SERPL: 1.4 {RATIO} (ref 1.2–2.2)
ALP SERPL-CCNC: 87 IU/L (ref 44–121)
ALT SERPL-CCNC: 25 IU/L (ref 0–44)
AST SERPL-CCNC: 23 IU/L (ref 0–40)
BASOPHILS # BLD AUTO: 0.1 X10E3/UL (ref 0–0.2)
BASOPHILS NFR BLD AUTO: 1 %
BILIRUB SERPL-MCNC: 0.5 MG/DL (ref 0–1.2)
BUN SERPL-MCNC: 22 MG/DL (ref 8–27)
BUN/CREAT SERPL: 19 (ref 10–24)
CALCIUM SERPL-MCNC: 9.7 MG/DL (ref 8.6–10.2)
CHLORIDE SERPL-SCNC: 104 MMOL/L (ref 96–106)
CO2 SERPL-SCNC: 27 MMOL/L (ref 20–29)
CREAT SERPL-MCNC: 1.17 MG/DL (ref 0.76–1.27)
EOSINOPHIL # BLD AUTO: 0.2 X10E3/UL (ref 0–0.4)
EOSINOPHIL NFR BLD AUTO: 4 %
ERYTHROCYTE [DISTWIDTH] IN BLOOD BY AUTOMATED COUNT: 12.7 % (ref 11.6–15.4)
GLOBULIN SER CALC-MCNC: 2.9 G/DL (ref 1.5–4.5)
GLUCOSE SERPL-MCNC: 105 MG/DL (ref 65–99)
HCT VFR BLD AUTO: 46.4 % (ref 37.5–51)
HGB BLD-MCNC: 15.3 G/DL (ref 13–17.7)
IMM GRANULOCYTES # BLD AUTO: 0 X10E3/UL (ref 0–0.1)
IMM GRANULOCYTES NFR BLD AUTO: 0 %
LYMPHOCYTES # BLD AUTO: 1 X10E3/UL (ref 0.7–3.1)
LYMPHOCYTES NFR BLD AUTO: 15 %
MCH RBC QN AUTO: 29.7 PG (ref 26.6–33)
MCHC RBC AUTO-ENTMCNC: 33 G/DL (ref 31.5–35.7)
MCV RBC AUTO: 90 FL (ref 79–97)
MONOCYTES # BLD AUTO: 0.7 X10E3/UL (ref 0.1–0.9)
MONOCYTES NFR BLD AUTO: 9 %
NEUTROPHILS # BLD AUTO: 4.9 X10E3/UL (ref 1.4–7)
NEUTROPHILS NFR BLD AUTO: 71 %
PLATELET # BLD AUTO: 267 X10E3/UL (ref 150–450)
POTASSIUM SERPL-SCNC: 4.3 MMOL/L (ref 3.5–5.2)
PROT SERPL-MCNC: 6.9 G/DL (ref 6–8.5)
RBC # BLD AUTO: 5.15 X10E6/UL (ref 4.14–5.8)
SODIUM SERPL-SCNC: 144 MMOL/L (ref 134–144)
TSH SERPL DL<=0.005 MIU/L-ACNC: 1.67 UIU/ML (ref 0.45–4.5)
WBC # BLD AUTO: 6.9 X10E3/UL (ref 3.4–10.8)

## 2021-12-01 ENCOUNTER — TELEPHONE (OUTPATIENT)
Dept: CARDIOLOGY | Facility: CLINIC | Age: 66
End: 2021-12-01

## 2021-12-01 NOTE — TELEPHONE ENCOUNTER
----- Message from JO ANN Vaughan sent at 12/1/2021 10:29 AM EST -----  Let pt know overall holter looks ok  Keep scheduled appt for stress test and f/u with Dr. Guardado

## 2021-12-01 NOTE — TELEPHONE ENCOUNTER
overall holter looks ok   Keep scheduled appt for stress test and f/u with Dr. Guardado   L/flavia for patient to call back

## 2021-12-02 ENCOUNTER — HOSPITAL ENCOUNTER (OUTPATIENT)
Dept: NUCLEAR MEDICINE | Facility: HOSPITAL | Age: 66
Discharge: HOME | End: 2021-12-02

## 2021-12-02 ENCOUNTER — HOSPITAL ENCOUNTER (OUTPATIENT)
Dept: CT IMAGING | Facility: HOSPITAL | Age: 66
Discharge: HOME | End: 2021-12-02

## 2021-12-02 ENCOUNTER — HOSPITAL ENCOUNTER (OUTPATIENT)
Dept: CARDIOLOGY | Facility: HOSPITAL | Age: 66
Discharge: HOME | End: 2021-12-02

## 2021-12-02 DIAGNOSIS — R55 NEAR SYNCOPE: ICD-10-CM

## 2021-12-02 DIAGNOSIS — R42 DIZZINESS: ICD-10-CM

## 2021-12-02 DIAGNOSIS — R51.9 NONINTRACTABLE HEADACHE, UNSPECIFIED CHRONICITY PATTERN, UNSPECIFIED HEADACHE TYPE: ICD-10-CM

## 2021-12-02 LAB
BH CV XLRA MEAS LEFT DIST CCA EDV: 25.1 CM/SEC
BH CV XLRA MEAS LEFT DIST CCA PSV: 83.9 CM/SEC
BH CV XLRA MEAS LEFT DIST ICA EDV: -24.2 CM/SEC
BH CV XLRA MEAS LEFT DIST ICA PSV: -69 CM/SEC
BH CV XLRA MEAS LEFT ICA/CCA RATIO: 0.7
BH CV XLRA MEAS LEFT PROX CCA EDV: 25.1 CM/SEC
BH CV XLRA MEAS LEFT PROX CCA PSV: 104 CM/SEC
BH CV XLRA MEAS LEFT PROX ECA PSV: -147 CM/SEC
BH CV XLRA MEAS LEFT PROX ICA EDV: -25.9 CM/SEC
BH CV XLRA MEAS LEFT PROX ICA PSV: -62.7 CM/SEC
BH CV XLRA MEAS LEFT PROX SCLA PSV: 58.1 CM/SEC
BH CV XLRA MEAS LEFT VERTEBRAL A PSV: -41.6 CM/SEC
BH CV XLRA MEAS RIGHT DIST CCA EDV: 21.1 CM/SEC
BH CV XLRA MEAS RIGHT DIST CCA PSV: 75.2 CM/SEC
BH CV XLRA MEAS RIGHT DIST ICA EDV: 28.6 CM/SEC
BH CV XLRA MEAS RIGHT DIST ICA PSV: 64 CM/SEC
BH CV XLRA MEAS RIGHT ICA/CCA RATIO: 0.8
BH CV XLRA MEAS RIGHT PROX CCA EDV: 16.8 CM/SEC
BH CV XLRA MEAS RIGHT PROX CCA PSV: 79.5 CM/SEC
BH CV XLRA MEAS RIGHT PROX ECA PSV: -95.1 CM/SEC
BH CV XLRA MEAS RIGHT PROX ICA EDV: -23.6 CM/SEC
BH CV XLRA MEAS RIGHT PROX ICA PSV: -65.9 CM/SEC
BH CV XLRA MEAS RIGHT PROX SCLA PSV: 137 CM/SEC
BH CV XLRA MEAS RIGHT VERTEBRAL A PSV: -47.2 CM/SEC
MAXIMAL PREDICTED HEART RATE: 155 BPM
STRESS TARGET HR: 132 BPM

## 2021-12-02 PROCEDURE — A9502 TC99M TETROFOSMIN: HCPCS | Performed by: INTERNAL MEDICINE

## 2021-12-02 PROCEDURE — 0 TECHNETIUM TETROFOSMIN KIT: Performed by: NURSE PRACTITIONER

## 2021-12-02 PROCEDURE — 78452 HT MUSCLE IMAGE SPECT MULT: CPT | Performed by: INTERNAL MEDICINE

## 2021-12-02 PROCEDURE — 93018 CV STRESS TEST I&R ONLY: CPT | Performed by: INTERNAL MEDICINE

## 2021-12-02 PROCEDURE — 93017 CV STRESS TEST TRACING ONLY: CPT

## 2021-12-02 PROCEDURE — 78452 HT MUSCLE IMAGE SPECT MULT: CPT

## 2021-12-02 PROCEDURE — 93306 TTE W/DOPPLER COMPLETE: CPT

## 2021-12-02 PROCEDURE — 93880 EXTRACRANIAL BILAT STUDY: CPT

## 2021-12-02 PROCEDURE — 0 TECHNETIUM TETROFOSMIN KIT: Performed by: INTERNAL MEDICINE

## 2021-12-02 PROCEDURE — 93016 CV STRESS TEST SUPVJ ONLY: CPT | Performed by: INTERNAL MEDICINE

## 2021-12-02 PROCEDURE — A9502 TC99M TETROFOSMIN: HCPCS | Performed by: NURSE PRACTITIONER

## 2021-12-02 PROCEDURE — 70450 CT HEAD/BRAIN W/O DYE: CPT

## 2021-12-02 PROCEDURE — 93306 TTE W/DOPPLER COMPLETE: CPT | Performed by: INTERNAL MEDICINE

## 2021-12-02 RX ADMIN — TETROFOSMIN 1 DOSE: 1.38 INJECTION, POWDER, LYOPHILIZED, FOR SOLUTION INTRAVENOUS at 09:55

## 2021-12-02 RX ADMIN — TETROFOSMIN 1 DOSE: 1.38 INJECTION, POWDER, LYOPHILIZED, FOR SOLUTION INTRAVENOUS at 11:30

## 2021-12-03 ENCOUNTER — TELEPHONE (OUTPATIENT)
Dept: FAMILY MEDICINE CLINIC | Facility: CLINIC | Age: 66
End: 2021-12-03

## 2021-12-05 LAB
BH CV ECHO MEAS - ACS: 2.4 CM
BH CV ECHO MEAS - AO MAX PG (FULL): 1.6 MMHG
BH CV ECHO MEAS - AO MAX PG: 7.2 MMHG
BH CV ECHO MEAS - AO MEAN PG (FULL): 1.1 MMHG
BH CV ECHO MEAS - AO MEAN PG: 3.7 MMHG
BH CV ECHO MEAS - AO ROOT AREA (BSA CORRECTED): 1.7
BH CV ECHO MEAS - AO ROOT AREA: 12 CM^2
BH CV ECHO MEAS - AO ROOT DIAM: 3.9 CM
BH CV ECHO MEAS - AO V2 MAX: 134.6 CM/SEC
BH CV ECHO MEAS - AO V2 MEAN: 91.5 CM/SEC
BH CV ECHO MEAS - AO V2 VTI: 30.8 CM
BH CV ECHO MEAS - AVA(I,A): 3.7 CM^2
BH CV ECHO MEAS - AVA(I,D): 3.7 CM^2
BH CV ECHO MEAS - AVA(V,A): 3.6 CM^2
BH CV ECHO MEAS - AVA(V,D): 3.6 CM^2
BH CV ECHO MEAS - BSA(HAYCOCK): 2.4 M^2
BH CV ECHO MEAS - BSA: 2.3 M^2
BH CV ECHO MEAS - BZI_BMI: 36.9 KILOGRAMS/M^2
BH CV ECHO MEAS - BZI_METRIC_HEIGHT: 177.8 CM
BH CV ECHO MEAS - BZI_METRIC_WEIGHT: 116.6 KG
BH CV ECHO MEAS - EDV(CUBED): 141.1 ML
BH CV ECHO MEAS - EDV(MOD-SP4): 128.2 ML
BH CV ECHO MEAS - EDV(TEICH): 129.8 ML
BH CV ECHO MEAS - EF(CUBED): 67.8 %
BH CV ECHO MEAS - EF(MOD-BP): 72 %
BH CV ECHO MEAS - EF(MOD-SP4): 71.8 %
BH CV ECHO MEAS - EF(TEICH): 59 %
BH CV ECHO MEAS - ESV(CUBED): 45.4 ML
BH CV ECHO MEAS - ESV(MOD-SP4): 36.1 ML
BH CV ECHO MEAS - ESV(TEICH): 53.3 ML
BH CV ECHO MEAS - FS: 31.5 %
BH CV ECHO MEAS - IVS/LVPW: 1.1
BH CV ECHO MEAS - IVSD: 1.2 CM
BH CV ECHO MEAS - LA DIMENSION(2D): 4.8 CM
BH CV ECHO MEAS - LV DIASTOLIC VOL/BSA (35-75): 55.2 ML/M^2
BH CV ECHO MEAS - LV MASS(C)D: 236.4 GRAMS
BH CV ECHO MEAS - LV MASS(C)DI: 101.8 GRAMS/M^2
BH CV ECHO MEAS - LV MAX PG: 5.6 MMHG
BH CV ECHO MEAS - LV MEAN PG: 2.6 MMHG
BH CV ECHO MEAS - LV SYSTOLIC VOL/BSA (12-30): 15.6 ML/M^2
BH CV ECHO MEAS - LV V1 MAX: 118.4 CM/SEC
BH CV ECHO MEAS - LV V1 MEAN: 73.7 CM/SEC
BH CV ECHO MEAS - LV V1 VTI: 27.9 CM
BH CV ECHO MEAS - LVIDD: 5.2 CM
BH CV ECHO MEAS - LVIDS: 3.6 CM
BH CV ECHO MEAS - LVOT AREA: 4.1 CM^2
BH CV ECHO MEAS - LVOT DIAM: 2.3 CM
BH CV ECHO MEAS - LVPWD: 1.1 CM
BH CV ECHO MEAS - MR MAX PG: 70.8 MMHG
BH CV ECHO MEAS - MR MAX VEL: 416.6 CM/SEC
BH CV ECHO MEAS - MV A MAX VEL: 60.5 CM/SEC
BH CV ECHO MEAS - MV DEC SLOPE: 395 CM/SEC^2
BH CV ECHO MEAS - MV DEC TIME: 0.24 SEC
BH CV ECHO MEAS - MV E MAX VEL: 47 CM/SEC
BH CV ECHO MEAS - MV E/A: 0.78
BH CV ECHO MEAS - MV MAX PG: 3.7 MMHG
BH CV ECHO MEAS - MV MEAN PG: 1.2 MMHG
BH CV ECHO MEAS - MV V2 MAX: 95.9 CM/SEC
BH CV ECHO MEAS - MV V2 MEAN: 49 CM/SEC
BH CV ECHO MEAS - MV V2 VTI: 31.2 CM
BH CV ECHO MEAS - MVA(VTI): 3.6 CM^2
BH CV ECHO MEAS - PA MAX PG (FULL): 2 MMHG
BH CV ECHO MEAS - PA MAX PG: 4.5 MMHG
BH CV ECHO MEAS - PA V2 MAX: 105.7 CM/SEC
BH CV ECHO MEAS - PI END-D VEL: 83.9 CM/SEC
BH CV ECHO MEAS - PI MAX PG: 8.5 MMHG
BH CV ECHO MEAS - PI MAX VEL: 146 CM/SEC
BH CV ECHO MEAS - PVA(V,A): 2.9 CM^2
BH CV ECHO MEAS - PVA(V,D): 2.9 CM^2
BH CV ECHO MEAS - QP/QS: 0.53
BH CV ECHO MEAS - RV MAX PG: 2.5 MMHG
BH CV ECHO MEAS - RV MEAN PG: 1 MMHG
BH CV ECHO MEAS - RV V1 MAX: 78.6 CM/SEC
BH CV ECHO MEAS - RV V1 MEAN: 45.4 CM/SEC
BH CV ECHO MEAS - RV V1 VTI: 15.5 CM
BH CV ECHO MEAS - RVDD: 3.8 CM
BH CV ECHO MEAS - RVOT AREA: 3.9 CM^2
BH CV ECHO MEAS - RVOT DIAM: 2.2 CM
BH CV ECHO MEAS - SI(AO): 159.2 ML/M^2
BH CV ECHO MEAS - SI(CUBED): 41.2 ML/M^2
BH CV ECHO MEAS - SI(LVOT): 48.8 ML/M^2
BH CV ECHO MEAS - SI(MOD-SP4): 39.7 ML/M^2
BH CV ECHO MEAS - SI(TEICH): 33 ML/M^2
BH CV ECHO MEAS - SV(AO): 369.6 ML
BH CV ECHO MEAS - SV(CUBED): 95.6 ML
BH CV ECHO MEAS - SV(LVOT): 113.4 ML
BH CV ECHO MEAS - SV(MOD-SP4): 92.1 ML
BH CV ECHO MEAS - SV(RVOT): 60.6 ML
BH CV ECHO MEAS - SV(TEICH): 76.5 ML
BH CV NUCLEAR PRIOR STUDY: 3
BH CV REST NUCLEAR ISOTOPE DOSE: 7.6 MCI
BH CV STRESS BP STAGE 1: NORMAL
BH CV STRESS BP STAGE 2: NORMAL
BH CV STRESS BP STAGE 3: NORMAL
BH CV STRESS BP STAGE 4: NORMAL
BH CV STRESS DURATION MIN STAGE 1: 3
BH CV STRESS DURATION MIN STAGE 2: 3
BH CV STRESS DURATION MIN STAGE 3: 3
BH CV STRESS DURATION MIN STAGE 4: 0
BH CV STRESS DURATION SEC STAGE 1: 0
BH CV STRESS DURATION SEC STAGE 2: 0
BH CV STRESS DURATION SEC STAGE 3: 0
BH CV STRESS DURATION SEC STAGE 4: 38
BH CV STRESS GRADE STAGE 1: 10
BH CV STRESS GRADE STAGE 2: 12
BH CV STRESS GRADE STAGE 3: 14
BH CV STRESS GRADE STAGE 4: 16
BH CV STRESS HR STAGE 1: 93
BH CV STRESS HR STAGE 2: 109
BH CV STRESS HR STAGE 3: 135
BH CV STRESS HR STAGE 4: 135
BH CV STRESS METS STAGE 1: 5
BH CV STRESS METS STAGE 2: 7.5
BH CV STRESS METS STAGE 3: 10
BH CV STRESS METS STAGE 4: 13.5
BH CV STRESS NUCLEAR ISOTOPE DOSE: 21.1 MCI
BH CV STRESS PROTOCOL 1: NORMAL
BH CV STRESS RECOVERY BP: NORMAL MMHG
BH CV STRESS RECOVERY HR: 84 BPM
BH CV STRESS SPEED STAGE 1: 1.7
BH CV STRESS SPEED STAGE 2: 2.5
BH CV STRESS SPEED STAGE 3: 3.4
BH CV STRESS SPEED STAGE 4: 4.2
BH CV STRESS STAGE 1: 1
BH CV STRESS STAGE 2: 2
BH CV STRESS STAGE 3: 3
BH CV STRESS STAGE 4: 4
LV EF NUC BP: 68 %
MAXIMAL PREDICTED HEART RATE: 155 BPM
PERCENT MAX PREDICTED HR: 90.32 %
STRESS BASELINE BP: NORMAL MMHG
STRESS BASELINE HR: 72 BPM
STRESS PERCENT HR: 106 %
STRESS POST ESTIMATED WORKLOAD: 12.1 METS
STRESS POST EXERCISE DUR MIN: 9 MIN
STRESS POST EXERCISE DUR SEC: 38 SEC
STRESS POST PEAK BP: NORMAL MMHG
STRESS POST PEAK HR: 140 BPM
STRESS TARGET HR: 132 BPM

## 2021-12-06 NOTE — PROGRESS NOTES
Let pt know that his stress test and echo both looked pretty normal  No ischemia  Keep f/u with Dr. Guardado

## 2021-12-08 DIAGNOSIS — I10 ESSENTIAL HYPERTENSION: ICD-10-CM

## 2021-12-08 RX ORDER — ATORVASTATIN CALCIUM 40 MG/1
40 TABLET, FILM COATED ORAL DAILY
Qty: 90 TABLET | Refills: 3 | Status: SHIPPED | OUTPATIENT
Start: 2021-12-08 | End: 2022-08-11 | Stop reason: SDUPTHER

## 2021-12-08 RX ORDER — LISINOPRIL AND HYDROCHLOROTHIAZIDE 20; 12.5 MG/1; MG/1
2 TABLET ORAL DAILY
Qty: 180 TABLET | Refills: 3 | Status: SHIPPED | OUTPATIENT
Start: 2021-12-08 | End: 2023-02-28

## 2021-12-08 NOTE — TELEPHONE ENCOUNTER
Caller: Sergei Vance    Relationship: Self    Requested Prescriptions:   Requested Prescriptions     Pending Prescriptions Disp Refills   • atorvastatin (Lipitor) 40 MG tablet 90 tablet 3     Sig: Take 1 tablet by mouth Daily.   • lisinopril-hydrochlorothiazide (PRINZIDE,ZESTORETIC) 20-12.5 MG per tablet 180 tablet 3     Sig: Take 2 tablets by mouth Daily.        Pharmacy where request should be sent: [id number] - [place], [place] - [address] [place] [address] - [phone] [facility] [phone] FX     Does the patient have less than a 3 day supply:  [x] Yes  [] No

## 2021-12-28 ENCOUNTER — TELEPHONE (OUTPATIENT)
Dept: FAMILY MEDICINE CLINIC | Facility: CLINIC | Age: 66
End: 2021-12-28

## 2021-12-28 NOTE — TELEPHONE ENCOUNTER
Caller: Sergei Vance    Relationship: Self    Best call back number:[phone] (M)    What is the medical concern/diagnosis: COLONOSCOPY    What specialty or service is being requested: GASTROENTEROLOGY    What is the provider, practice or medical service name: UNKNOWN    What is the office location: UNKNOWN    What is the office phone number: UNKNOWN    Any additional details: PATIENT WOULD LIKE TO HAVE HIS COLONOSCOPY SCHEDULED, PLEASE ADVISE WHEN SCHEDULED

## 2022-01-13 PROBLEM — E78.2 MIXED HYPERLIPIDEMIA: Status: ACTIVE | Noted: 2017-06-19

## 2022-01-13 NOTE — PROGRESS NOTES
Date of Office Visit: 2022  Encounter Provider: Dr. Edvin Guardado  Place of Service: [facility] [place]  Patient Name: Sergei Vance  :1955  Lyell, Reggie Duane, MD    Chief Complaint   Patient presents with   • Palpitations     2 month follow up stress/holter   • Hypertension   • Hyperlipidemia     History of Present Illness:    IM pleased to see Mr. Vance in my office today as a follow-up.    As you know, patient is 66-year-old white gentleman whose past medical history significant for hypertension, hyperlipidemia, who came today for follow-up.    In 2021, patient was seen by Linsey Miller in [place] office for symptom of dizziness and lightheadedness.  Patient underwent Holter monitor which showed predominantly sinus rhythm with minimum heart rate of 40 and a maximum heart rate of 100 bpm.  An average heart rate of 69 bpm.  Short run of nonspecific paroxysmal atrial tachycardia was noted.  No significant pause of bradycardia or tachyarrhythmia was noted.  Patient underwent stress testing which she walked for total of 9 minutes and 38 seconds.  Cardiolite imaging showed no ischemia or myocardial infarction.  Echocardiogram showed preserved left ventricle function with a EF of 60 to 65%.  No valvular heart disease noted.    Since the previous visit, patient is overall doing fairly well.  Patient denies any further episode of dizziness and lightheadedness.  His description of his presentation in 2021 is consistent with possible vertigo.  Patient denies any orthopnea or PND no syncope or presyncope.    At this stage I will continue current treatment.  Blood pressure is very well controlled.  His symptom of dizziness have not recurred.        Past Medical History:   Diagnosis Date   • Abnormal stress test    • Adenomatous polyp 3/23/2020   • BPH (benign prostatic hyperplasia)    • Elevated PSA    • GERD (gastroesophageal reflux disease)    • Hyperlipidemia    •  Hypertension    • Prostate cancer (HCC)    • Renal insufficiency          Past Surgical History:   Procedure Laterality Date   • VASECTOMY             Current Outpatient Medications:   •  amLODIPine (NORVASC) 10 MG tablet, Take 1 tablet by mouth Daily., Disp: 90 tablet, Rfl: 3  •  atorvastatin (Lipitor) 40 MG tablet, Take 1 tablet by mouth Daily., Disp: 90 tablet, Rfl: 3  •  lisinopril-hydrochlorothiazide (PRINZIDE,ZESTORETIC) 20-12.5 MG per tablet, Take 2 tablets by mouth Daily., Disp: 180 tablet, Rfl: 3  •  omeprazole (priLOSEC) 40 MG capsule, Take 1 capsule by mouth Daily., Disp: 90 capsule, Rfl: 3      Social History     Socioeconomic History   • Marital status:    Tobacco Use   • Smoking status: Former Smoker     Packs/day: 1.00     Types: Cigarettes     Start date:      Quit date:      Years since quittin.0   • Smokeless tobacco: Former User     Types: Chew     Quit date:    Vaping Use   • Vaping Use: Never used   Substance and Sexual Activity   • Alcohol use: Yes     Alcohol/week: 2.0 standard drinks     Types: 2 Cans of beer per week   • Drug use: No   • Sexual activity: Defer         Review of Systems   Constitutional: Negative for chills and fever.   HENT: Negative for ear discharge and nosebleeds.    Eyes: Negative for discharge and redness.   Cardiovascular: Negative for chest pain, orthopnea, palpitations, paroxysmal nocturnal dyspnea and syncope.   Respiratory: Negative for cough, shortness of breath and wheezing.    Endocrine: Negative for heat intolerance.   Skin: Negative for rash.   Musculoskeletal: Negative for arthritis and myalgias.   Gastrointestinal: Negative for abdominal pain, melena, nausea and vomiting.   Genitourinary: Negative for dysuria and hematuria.   Neurological: Negative for dizziness, light-headedness, numbness and tremors.   Psychiatric/Behavioral: Negative for depression. The patient is not nervous/anxious.        Procedures    Procedures    No orders to  "display           Objective:    /84   Pulse 66   Ht 177.8 cm (70\")   Wt 117 kg (257 lb)   BMI 36.88 kg/m²         Constitutional:       Appearance: Well-developed.   Eyes:      General: No scleral icterus.        Right eye: No discharge.   HENT:      Head: Normocephalic and atraumatic.   Neck:      Thyroid: No thyromegaly.      Lymphadenopathy: No cervical adenopathy.   Pulmonary:      Effort: Pulmonary effort is normal. No respiratory distress.      Breath sounds: Normal breath sounds. No wheezing. No rales.   Cardiovascular:      Normal rate. Regular rhythm.      No gallop.   Abdominal:      Tenderness: There is no abdominal tenderness.   Skin:     Findings: No erythema or rash.   Neurological:      Mental Status: Alert and oriented to person, place, and time.             Assessment:       Diagnosis Plan   1. Mixed hyperlipidemia     2. Benign essential HTN     3. Palpitations              Plan:       MDM:    1 dizziness:    Patient has not had any further episode.  He had extensive work-up which has been unremarkable.  Recommend observation.    2.  Palpitation:    Patient has not had further episodes of palpitation    3.  Hypertension:    Blood pressure is very well controlled.    4.  Hyperlipidemia:    Patient is on Lipitor.  I would recommend to repeat lipid panel testing  "

## 2022-01-14 ENCOUNTER — OFFICE VISIT (OUTPATIENT)
Dept: CARDIOLOGY | Facility: CLINIC | Age: 67
End: 2022-01-14

## 2022-01-14 VITALS
HEIGHT: 70 IN | SYSTOLIC BLOOD PRESSURE: 138 MMHG | DIASTOLIC BLOOD PRESSURE: 84 MMHG | WEIGHT: 257 LBS | HEART RATE: 66 BPM | BODY MASS INDEX: 36.79 KG/M2

## 2022-01-14 DIAGNOSIS — I10 BENIGN ESSENTIAL HTN: ICD-10-CM

## 2022-01-14 DIAGNOSIS — R00.2 PALPITATIONS: ICD-10-CM

## 2022-01-14 DIAGNOSIS — E78.2 MIXED HYPERLIPIDEMIA: Primary | ICD-10-CM

## 2022-01-14 PROCEDURE — 99214 OFFICE O/P EST MOD 30 MIN: CPT | Performed by: INTERNAL MEDICINE

## 2022-01-17 ENCOUNTER — OFFICE VISIT (OUTPATIENT)
Dept: FAMILY MEDICINE CLINIC | Facility: CLINIC | Age: 67
End: 2022-01-17

## 2022-01-17 ENCOUNTER — HOSPITAL ENCOUNTER (OUTPATIENT)
Dept: GENERAL RADIOLOGY | Facility: HOSPITAL | Age: 67
Discharge: HOME | End: 2022-01-17

## 2022-01-17 VITALS
WEIGHT: 261 LBS | HEIGHT: 70 IN | BODY MASS INDEX: 37.37 KG/M2 | TEMPERATURE: 98.1 F | OXYGEN SATURATION: 99 % | SYSTOLIC BLOOD PRESSURE: 144 MMHG | HEART RATE: 62 BPM | RESPIRATION RATE: 18 BRPM | DIASTOLIC BLOOD PRESSURE: 81 MMHG

## 2022-01-17 DIAGNOSIS — R10.9 FLANK PAIN: ICD-10-CM

## 2022-01-17 DIAGNOSIS — M54.6 CHRONIC RIGHT-SIDED THORACIC BACK PAIN: ICD-10-CM

## 2022-01-17 DIAGNOSIS — R10.9 FLANK PAIN: Primary | ICD-10-CM

## 2022-01-17 DIAGNOSIS — G89.29 CHRONIC RIGHT-SIDED THORACIC BACK PAIN: ICD-10-CM

## 2022-01-17 DIAGNOSIS — N20.0 RECURRENT KIDNEY STONES: ICD-10-CM

## 2022-01-17 DIAGNOSIS — R42 VERTIGO: ICD-10-CM

## 2022-01-17 LAB
BILIRUB BLD-MCNC: NEGATIVE MG/DL
CLARITY, POC: CLEAR
COLOR UR: YELLOW
EXPIRATION DATE: NORMAL
GLUCOSE UR STRIP-MCNC: NEGATIVE MG/DL
KETONES UR QL: NEGATIVE
LEUKOCYTE EST, POC: NEGATIVE
Lab: NORMAL
NITRITE UR-MCNC: NEGATIVE MG/ML
PH UR: 7 [PH] (ref 5–8)
PROT UR STRIP-MCNC: NEGATIVE MG/DL
RBC # UR STRIP: NEGATIVE /UL
SP GR UR: 1.02 (ref 1–1.03)
UROBILINOGEN UR QL: NORMAL

## 2022-01-17 PROCEDURE — 99214 OFFICE O/P EST MOD 30 MIN: CPT | Performed by: NURSE PRACTITIONER

## 2022-01-17 PROCEDURE — 81003 URINALYSIS AUTO W/O SCOPE: CPT | Performed by: NURSE PRACTITIONER

## 2022-01-17 RX ORDER — MECLIZINE HYDROCHLORIDE 25 MG/1
25 TABLET ORAL 3 TIMES DAILY PRN
Qty: 30 TABLET | Refills: 0 | Status: SHIPPED | OUTPATIENT
Start: 2022-01-17 | End: 2022-09-19

## 2022-01-17 NOTE — PROGRESS NOTES
Chief Complaint  Back Pain and Cystitis (kidney pain)    Subjective          Sergei Vance presents to [facility] for flank and thoracic pain    History of Present Illness      Patient is here for a couple months of right flank pain that extends to his thoracic area.  He previously has had kidney stones in the past.  He reports his pain is a dull ache that does not seem to go away.  He does have a very active job and at times is bending and stooping a lot.  He has not seen any bleeding.  He has not had a kidney stone in about 5 years.  Patient does have a history of prostate cancer.  He follows with urology on a regular basis.    Patient recently had cardiac work-up.  He does occasionally have the dizziness that he previously had that seems vertigo-like.    Sergei Vance  has a past medical history of Abnormal stress test, Adenomatous polyp (3/23/2020), BPH (benign prostatic hyperplasia), Elevated PSA, GERD (gastroesophageal reflux disease), Hyperlipidemia, Hypertension, Prostate cancer (HCC), and Renal insufficiency.      Review of Systems   Constitutional: Negative for fatigue and fever.   Respiratory: Negative for cough.    Gastrointestinal: Negative for abdominal pain.   Genitourinary: Negative for dysuria and frequency.        Flank pain   Musculoskeletal: Negative for arthralgias.        Objective       Current Outpatient Medications:   •  amLODIPine (NORVASC) 10 MG tablet, Take 1 tablet by mouth Daily., Disp: 90 tablet, Rfl: 3  •  atorvastatin (Lipitor) 40 MG tablet, Take 1 tablet by mouth Daily., Disp: 90 tablet, Rfl: 3  •  lisinopril-hydrochlorothiazide (PRINZIDE,ZESTORETIC) 20-12.5 MG per tablet, Take 2 tablets by mouth Daily., Disp: 180 tablet, Rfl: 3  •  omeprazole (priLOSEC) 40 MG capsule, Take 1 capsule by mouth Daily., Disp: 90 capsule, Rfl: 3  •  meclizine (ANTIVERT) 25 MG tablet, Take 1 tablet by mouth 3 (Three) Times a Day As Needed for Dizziness., Disp: 30  "tablet, Rfl: 0    Vital Signs:      /81 (BP Location: Right arm, Patient Position: Sitting, Cuff Size: Large Adult)   Pulse 62   Temp 98.1 °F (36.7 °C) (Infrared)   Resp 18   Ht 177.8 cm (70\")   Wt 118 kg (261 lb)   SpO2 99%   BMI 37.45 kg/m²     Vitals:    01/17/22 1348   BP: 144/81   BP Location: Right arm   Patient Position: Sitting   Cuff Size: Large Adult   Pulse: 62   Resp: 18   Temp: 98.1 °F (36.7 °C)   TempSrc: Infrared   SpO2: 99%   Weight: 118 kg (261 lb)   Height: 177.8 cm (70\")      Physical Exam  Vitals and nursing note reviewed.   Constitutional:       Appearance: He is well-developed.   HENT:      Right Ear: Tympanic membrane and ear canal normal.      Left Ear: Tympanic membrane and ear canal normal.   Cardiovascular:      Rate and Rhythm: Normal rate and regular rhythm.      Heart sounds: Normal heart sounds. No murmur heard.  No friction rub. No gallop.    Pulmonary:      Effort: Pulmonary effort is normal.      Breath sounds: Normal breath sounds. No wheezing or rales.   Abdominal:      General: Bowel sounds are normal.      Palpations: Abdomen is soft.      Tenderness: There is no abdominal tenderness.      Comments: Right flank tenderness   Musculoskeletal:      Comments: Thoracic spine with mild paraspinal tenderness in lower levels   Skin:     General: Skin is warm and dry.   Neurological:      Mental Status: He is alert.   Psychiatric:         Mood and Affect: Mood normal.         Thought Content: Thought content normal.         Judgment: Judgment normal.        Result Review :                PHQ-9 Depression Screening  Little interest or pleasure in doing things?     Feeling down, depressed, or hopeless?     Trouble falling or staying asleep, or sleeping too much?     Feeling tired or having little energy?     Poor appetite or overeating?     Feeling bad about yourself - or that you are a failure or have let yourself or your family down?     Trouble concentrating on things, such " as reading the newspaper or watching television?     Moving or speaking so slowly that other people could have noticed? Or the opposite - being so fidgety or restless that you have been moving around a lot more than usual?     Thoughts that you would be better off dead, or of hurting yourself in some way?     PHQ-9 Total Score     If you checked off any problems, how difficult have these problems made it for you to do your work, take care of things at home, or get along with other people?             Assessment and Plan    Diagnoses and all orders for this visit:    1. Flank pain (Primary)  Comments:  Evaluating for kidney stone.  Will call with results after CT is not culture done  Orders:  -     Urine Culture - Urine, Urine, Clean Catch  -     POC Urinalysis Dipstick, Automated  -     CT Abdomen Pelvis Stone Protocol; Future  -     XR Spine Thoracic 3 View; Future    2. Recurrent kidney stones  -     Urine Culture - Urine, Urine, Clean Catch  -     POC Urinalysis Dipstick, Automated  -     CT Abdomen Pelvis Stone Protocol; Future    3. Chronic right-sided thoracic back pain  Comments:  Thoracic x-ray today.  Orders:  -     XR Spine Thoracic 3 View; Future    4. Vertigo  Comments:  Try meclizine as needed    Other orders  -     meclizine (ANTIVERT) 25 MG tablet; Take 1 tablet by mouth 3 (Three) Times a Day As Needed for Dizziness.  Dispense: 30 tablet; Refill: 0         Problem List Items Addressed This Visit     None      Visit Diagnoses     Flank pain    -  Primary    Evaluating for kidney stone.  Will call with results after CT is not culture done    Relevant Orders    Urine Culture - Urine, Urine, Clean Catch    POC Urinalysis Dipstick, Automated (Completed)    CT Abdomen Pelvis Stone Protocol    XR Spine Thoracic 3 View    Recurrent kidney stones        Relevant Orders    Urine Culture - Urine, Urine, Clean Catch    POC Urinalysis Dipstick, Automated (Completed)    CT Abdomen Pelvis Stone Protocol    Chronic  right-sided thoracic back pain        Thoracic x-ray today.    Relevant Orders    XR Spine Thoracic 3 View    Vertigo        Try meclizine as needed          Follow Up   Return if symptoms worsen or fail to improve, for Next scheduled follow up.  Patient was given instructions and counseling regarding his condition or for health maintenance advice. Please see specific information pulled into the AVS if appropriate.

## 2022-01-19 ENCOUNTER — HOSPITAL ENCOUNTER (OUTPATIENT)
Dept: CT IMAGING | Facility: HOSPITAL | Age: 67
Discharge: HOME | End: 2022-01-19

## 2022-01-19 ENCOUNTER — HOSPITAL ENCOUNTER (OUTPATIENT)
Dept: GENERAL RADIOLOGY | Facility: HOSPITAL | Age: 67
Discharge: HOME | End: 2022-01-19

## 2022-01-19 DIAGNOSIS — N20.0 RECURRENT KIDNEY STONES: ICD-10-CM

## 2022-01-19 DIAGNOSIS — R10.9 FLANK PAIN: ICD-10-CM

## 2022-01-19 LAB
BACTERIA UR CULT: NO GROWTH
BACTERIA UR CULT: NORMAL

## 2022-01-19 PROCEDURE — 74176 CT ABD & PELVIS W/O CONTRAST: CPT

## 2022-01-19 PROCEDURE — 72072 X-RAY EXAM THORAC SPINE 3VWS: CPT

## 2022-03-09 ENCOUNTER — ON CAMPUS - OUTPATIENT (OUTPATIENT)
Dept: RURAL HOSPITAL 3 | Facility: HOSPITAL | Age: 67
End: 2022-03-09
Payer: COMMERCIAL

## 2022-03-09 DIAGNOSIS — K57.30 DIVERTICULOSIS OF LARGE INTESTINE WITHOUT PERFORATION OR ABS: ICD-10-CM

## 2022-03-09 DIAGNOSIS — D12.3 BENIGN NEOPLASM OF TRANSVERSE COLON: ICD-10-CM

## 2022-03-09 DIAGNOSIS — D12.0 BENIGN NEOPLASM OF CECUM: ICD-10-CM

## 2022-03-09 DIAGNOSIS — Z86.010 PERSONAL HISTORY OF COLONIC POLYPS: ICD-10-CM

## 2022-03-09 PROCEDURE — 45385 COLONOSCOPY W/LESION REMOVAL: CPT | Mod: 33 | Performed by: INTERNAL MEDICINE

## 2022-08-11 ENCOUNTER — OFFICE VISIT (OUTPATIENT)
Dept: FAMILY MEDICINE CLINIC | Facility: CLINIC | Age: 67
End: 2022-08-11

## 2022-08-11 VITALS
BODY MASS INDEX: 35.93 KG/M2 | HEIGHT: 70 IN | WEIGHT: 251 LBS | SYSTOLIC BLOOD PRESSURE: 128 MMHG | DIASTOLIC BLOOD PRESSURE: 79 MMHG | OXYGEN SATURATION: 97 % | RESPIRATION RATE: 16 BRPM | TEMPERATURE: 98.5 F | HEART RATE: 67 BPM

## 2022-08-11 DIAGNOSIS — Z23 NEED FOR VACCINATION FOR PNEUMOCOCCUS: ICD-10-CM

## 2022-08-11 DIAGNOSIS — H11.32 SUBCONJUNCTIVAL HEMORRHAGE, NON-TRAUMATIC, LEFT: ICD-10-CM

## 2022-08-11 DIAGNOSIS — I10 BENIGN ESSENTIAL HTN: ICD-10-CM

## 2022-08-11 DIAGNOSIS — Z23 NEED FOR SHINGLES VACCINE: ICD-10-CM

## 2022-08-11 DIAGNOSIS — K21.9 GASTROESOPHAGEAL REFLUX DISEASE WITHOUT ESOPHAGITIS: ICD-10-CM

## 2022-08-11 DIAGNOSIS — E66.01 MORBIDLY OBESE: ICD-10-CM

## 2022-08-11 DIAGNOSIS — E78.2 MIXED HYPERLIPIDEMIA: Primary | ICD-10-CM

## 2022-08-11 LAB
BILIRUB BLD-MCNC: NEGATIVE MG/DL
CLARITY, POC: CLEAR
COLOR UR: YELLOW
EXPIRATION DATE: ABNORMAL
GLUCOSE UR STRIP-MCNC: NEGATIVE MG/DL
KETONES UR QL: NEGATIVE
LEUKOCYTE EST, POC: NEGATIVE
Lab: ABNORMAL
NITRITE UR-MCNC: NEGATIVE MG/ML
PH UR: 7.5 [PH] (ref 5–8)
PROT UR STRIP-MCNC: NEGATIVE MG/DL
RBC # UR STRIP: NEGATIVE /UL
SP GR UR: 1.02 (ref 1–1.03)
UROBILINOGEN UR QL: ABNORMAL

## 2022-08-11 PROCEDURE — 99214 OFFICE O/P EST MOD 30 MIN: CPT

## 2022-08-11 PROCEDURE — 90471 IMMUNIZATION ADMIN: CPT

## 2022-08-11 PROCEDURE — 90472 IMMUNIZATION ADMIN EACH ADD: CPT

## 2022-08-11 PROCEDURE — 90750 HZV VACC RECOMBINANT IM: CPT

## 2022-08-11 PROCEDURE — 81003 URINALYSIS AUTO W/O SCOPE: CPT

## 2022-08-11 PROCEDURE — 90677 PCV20 VACCINE IM: CPT

## 2022-08-11 RX ORDER — OMEPRAZOLE 40 MG/1
40 CAPSULE, DELAYED RELEASE ORAL DAILY
Qty: 90 CAPSULE | Refills: 3 | Status: CANCELLED | OUTPATIENT
Start: 2022-08-11

## 2022-08-11 RX ORDER — ATORVASTATIN CALCIUM 40 MG/1
40 TABLET, FILM COATED ORAL DAILY
Qty: 90 TABLET | Refills: 3 | Status: CANCELLED | OUTPATIENT
Start: 2022-08-11

## 2022-08-11 RX ORDER — AMLODIPINE BESYLATE 10 MG/1
TABLET ORAL
Qty: 90 TABLET | Refills: 3 | Status: SHIPPED | OUTPATIENT
Start: 2022-08-11

## 2022-08-11 RX ORDER — ATORVASTATIN CALCIUM 40 MG/1
40 TABLET, FILM COATED ORAL DAILY
Qty: 90 TABLET | Refills: 3 | Status: SHIPPED | OUTPATIENT
Start: 2022-08-11

## 2022-08-11 RX ORDER — LISINOPRIL AND HYDROCHLOROTHIAZIDE 20; 12.5 MG/1; MG/1
2 TABLET ORAL DAILY
Qty: 180 TABLET | Refills: 3 | Status: CANCELLED | OUTPATIENT
Start: 2022-08-11

## 2022-08-11 NOTE — ASSESSMENT & PLAN NOTE
Advise ophthalmologist of concern.  Go to ED or urgent care if vision loss or change occurs or other condition.

## 2022-08-11 NOTE — ASSESSMENT & PLAN NOTE
Patient's (Body mass index is 36.01 kg/m².) indicates that they are obese (BMI >30) with health conditions that include hypertension, dyslipidemias and GERD . Weight is unchanged. BMI is is above average; BMI management plan is completed. We discussed portion control and increasing exercise.

## 2022-08-11 NOTE — PROGRESS NOTES
Subjective   Sergei Vance is a 67 y.o. male.     Chief Complaint   Patient presents with   • Hypertension   • Hyperlipidemia       Patient presents to establish care with new provider.  Has been established with other provider in this office and well managed.  Here for routine medication refills.     Presents with new onset red eye with blood pooling. No known history of accident/blow.  Hypertension controlled.         The following portions of the patient's history were reviewed and updated as appropriate: allergies, current medications, past family history, past medical history, past social history, past surgical history and problem list.  Chart review for continuation of care by new provider.    Allergies:  No Known Allergies    Social History:  Social History     Socioeconomic History   • Marital status:    Tobacco Use   • Smoking status: Former Smoker     Packs/day: 1.00     Types: Cigarettes     Start date:      Quit date:      Years since quittin.6   • Smokeless tobacco: Former User     Types: Chew     Quit date:    Vaping Use   • Vaping Use: Never used   Substance and Sexual Activity   • Alcohol use: Yes     Alcohol/week: 2.0 standard drinks     Types: 2 Cans of beer per week   • Drug use: No   • Sexual activity: Defer       Family History:  Family History   Problem Relation Age of Onset   • Arrhythmia Mother    • No Known Problems Father    • Colon cancer Maternal Grandmother    • Prostate cancer Maternal Grandfather        Past Medical History :  Patient Active Problem List   Diagnosis   • Benign prostatic hyperplasia   • Gastroesophageal reflux disease   • Mixed hyperlipidemia   • Benign essential HTN   • Left axis deviation   • Renal insufficiency, mild   • Residual hemorrhoidal skin tags   • Prostate cancer (HCC)   • Adenomatous polyp   • Allergic conjunctivitis   • Encounter for preventive health examination   • Morbidly obese (HCC)   • Dizziness   • Palpitations   •  Subconjunctival hemorrhage, non-traumatic, left       Medication List:  Outpatient Encounter Medications as of 8/11/2022   Medication Sig Dispense Refill   • amLODIPine (NORVASC) 10 MG tablet Take 1 tablet by mouth Daily. 90 tablet 3   • atorvastatin (Lipitor) 40 MG tablet Take 1 tablet by mouth Daily. 90 tablet 3   • lisinopril-hydrochlorothiazide (PRINZIDE,ZESTORETIC) 20-12.5 MG per tablet Take 2 tablets by mouth Daily. 180 tablet 3   • omeprazole (priLOSEC) 40 MG capsule Take 1 capsule by mouth Daily. 90 capsule 3   • [DISCONTINUED] atorvastatin (Lipitor) 40 MG tablet Take 1 tablet by mouth Daily. 90 tablet 3   • meclizine (ANTIVERT) 25 MG tablet Take 1 tablet by mouth 3 (Three) Times a Day As Needed for Dizziness. 30 tablet 0     No facility-administered encounter medications on file as of 8/11/2022.       Past Surgical History:  Past Surgical History:   Procedure Laterality Date   • VASECTOMY         Review of Systems:  Review of Systems   Constitutional: Negative for activity change, appetite change, chills, diaphoresis, fatigue, fever, unexpected weight gain and unexpected weight loss.   HENT: Negative for facial swelling, hearing loss, sinus pressure, sore throat, swollen glands, trouble swallowing and voice change.    Eyes: Positive for blurred vision, double vision and redness. Negative for photophobia, pain, discharge, itching and visual disturbance.   Respiratory: Negative for cough, chest tightness and shortness of breath.    Cardiovascular: Negative for chest pain, palpitations and leg swelling.   Gastrointestinal: Positive for GERD. Negative for abdominal distention, abdominal pain, anal bleeding, blood in stool, constipation, diarrhea, nausea, rectal pain, vomiting and indigestion.   Endocrine: Negative.    Genitourinary: Positive for decreased urine volume and urgency. Testicular pain: sees urology for BPH/prostate cancer.   Musculoskeletal: Negative.    Allergic/Immunologic: Negative.   "  Neurological: Negative.    Hematological: Negative.    Psychiatric/Behavioral: Negative.        I have reviewed and confirmed the accuracy of the HPI and ROS as documented by the MA/LPN/RN JO ANN Soriano    Vital Signs:  Visit Vitals  /79 (BP Location: Right arm, Patient Position: Sitting, Cuff Size: Large Adult)   Pulse 67   Temp 98.5 °F (36.9 °C) (Infrared)   Resp 16   Ht 177.8 cm (70\")   Wt 114 kg (251 lb)   SpO2 97%   BMI 36.01 kg/m²       Physical Exam  Vitals and nursing note reviewed.   Constitutional:       General: He is not in acute distress.     Appearance: Normal appearance. He is well-groomed. He is obese. He is not ill-appearing, toxic-appearing or diaphoretic.   HENT:      Head: Normocephalic and atraumatic.      Right Ear: Tympanic membrane, ear canal and external ear normal. There is no impacted cerumen.      Left Ear: Tympanic membrane, ear canal and external ear normal. There is no impacted cerumen.      Nose: Nose normal. No congestion or rhinorrhea.      Mouth/Throat:      Mouth: Mucous membranes are moist.      Pharynx: No oropharyngeal exudate or posterior oropharyngeal erythema.   Eyes:      General: Lids are normal. Lids are everted, no foreign bodies appreciated. Vision grossly intact. Gaze aligned appropriately. No allergic shiner.        Right eye: No foreign body, discharge or hordeolum.         Left eye: No foreign body, discharge or hordeolum.      Extraocular Movements: Extraocular movements intact.      Right eye: Normal extraocular motion and no nystagmus.      Left eye: Normal extraocular motion and no nystagmus.      Conjunctiva/sclera:      Right eye: Right conjunctiva is not injected.      Left eye: Left conjunctiva is injected. Hemorrhage present. No chemosis or exudate.     Pupils: Pupils are equal, round, and reactive to light.     Neck:      Thyroid: No thyroid mass, thyromegaly or thyroid tenderness.      Vascular: No carotid bruit.      Trachea: Trachea " normal.   Cardiovascular:      Rate and Rhythm: Normal rate and regular rhythm.      Pulses: Normal pulses.      Heart sounds: Normal heart sounds.   Pulmonary:      Effort: Pulmonary effort is normal.      Breath sounds: Normal breath sounds.   Abdominal:      General: Bowel sounds are normal. There is no distension.      Palpations: Abdomen is soft. Abdomen is not rigid. There is no mass.      Tenderness: There is no abdominal tenderness. There is no right CVA tenderness, left CVA tenderness, guarding or rebound.      Hernia: No hernia is present.   Musculoskeletal:         General: Normal range of motion.      Cervical back: Normal range of motion and neck supple.      Right lower leg: No edema.      Left lower leg: No edema.   Lymphadenopathy:      Cervical: No cervical adenopathy.   Skin:     General: Skin is warm and dry.      Capillary Refill: Capillary refill takes less than 2 seconds.      Comments: Multiple skin tags noted. No abnormal lesions identified.   Neurological:      General: No focal deficit present.      Mental Status: He is alert and oriented to person, place, and time.   Psychiatric:         Mood and Affect: Mood normal.         Behavior: Behavior normal. Behavior is cooperative.         Assessment and Plan:  Problem List Items Addressed This Visit        Cardiac and Vasculature    Mixed hyperlipidemia - Primary    Current Assessment & Plan     We will recheck lipid levels. Continue medication and diet and lifestyle.         Relevant Medications    atorvastatin (Lipitor) 40 MG tablet    Other Relevant Orders    Lipid Panel    Benign essential HTN    Current Assessment & Plan     Hypertension is improving with treatment.  Continue current treatment regimen.  Dietary sodium restriction.  Regular aerobic exercise.  Continue current medications.  Blood pressure will be reassessed at the next regular appointment.         Relevant Orders    Comprehensive Metabolic Panel    CBC & Differential     Lipid Panel    POC Urinalysis Dipstick, Automated       Endocrine and Metabolic    Morbidly obese (HCC)    Current Assessment & Plan     Patient's (Body mass index is 36.01 kg/m².) indicates that they are obese (BMI >30) with health conditions that include hypertension, dyslipidemias and GERD . Weight is unchanged. BMI is is above average; BMI management plan is completed. We discussed portion control and increasing exercise.          Relevant Orders    TSH Rfx On Abnormal To Free T4       Eye    Subconjunctival hemorrhage, non-traumatic, left    Current Assessment & Plan     Advise ophthalmologist of concern.  Go to ED or urgent care if vision loss or change occurs or other condition.            Gastrointestinal Abdominal     Gastroesophageal reflux disease          An After Visit Summary and PPPS were given to the patient.       I wore protective equipment throughout this patient encounter to include mask. Hand hygiene was performed before donning protective equipment and after removal when leaving the room.    Findings discussed.  All questions answered.

## 2022-08-11 NOTE — PATIENT INSTRUCTIONS
Continue current plan of care as discussed.   Take medication as ordered (if applicable).    Practice good sleep hygiene.  Eat a well balanced diet with fresh fruit and vegetables.    Drink at least 8 bottles of water or equivalent per day.     Limit sweetened beverages, sodas, juices.    Bake, boil, broil or grill your food, avoid eating fried foods.   Exercise at least 150 minutes per week.        Follow up in 3 months for annual physical exam and lab review.

## 2022-08-17 RX ORDER — OMEPRAZOLE 40 MG/1
CAPSULE, DELAYED RELEASE ORAL
Qty: 90 CAPSULE | Refills: 3 | Status: SHIPPED | OUTPATIENT
Start: 2022-08-17

## 2022-09-12 ENCOUNTER — TRANSCRIBE ORDERS (OUTPATIENT)
Dept: GENERAL RADIOLOGY | Facility: HOSPITAL | Age: 67
End: 2022-09-12

## 2022-09-12 ENCOUNTER — HOSPITAL ENCOUNTER (OUTPATIENT)
Dept: GENERAL RADIOLOGY | Facility: HOSPITAL | Age: 67
Discharge: HOME | End: 2022-09-12
Admitting: UROLOGY

## 2022-09-12 DIAGNOSIS — N20.0 CALCULUS, RENAL: Primary | ICD-10-CM

## 2022-09-12 DIAGNOSIS — N20.0 CALCULUS, RENAL: ICD-10-CM

## 2022-09-12 PROCEDURE — 74018 RADEX ABDOMEN 1 VIEW: CPT

## 2022-09-13 LAB
ALBUMIN SERPL-MCNC: 4.3 G/DL (ref 3.8–4.8)
ALBUMIN/GLOB SERPL: 1.8 {RATIO} (ref 1.2–2.2)
ALP SERPL-CCNC: 85 IU/L (ref 44–121)
ALT SERPL-CCNC: 13 IU/L (ref 0–44)
AST SERPL-CCNC: 18 IU/L (ref 0–40)
BASOPHILS # BLD AUTO: 0.1 X10E3/UL (ref 0–0.2)
BASOPHILS NFR BLD AUTO: 1 %
BILIRUB SERPL-MCNC: 0.5 MG/DL (ref 0–1.2)
BUN SERPL-MCNC: 18 MG/DL (ref 8–27)
BUN/CREAT SERPL: 15 (ref 10–24)
CALCIUM SERPL-MCNC: 9.5 MG/DL (ref 8.6–10.2)
CHLORIDE SERPL-SCNC: 105 MMOL/L (ref 96–106)
CHOLEST SERPL-MCNC: 125 MG/DL (ref 100–199)
CO2 SERPL-SCNC: 25 MMOL/L (ref 20–29)
CREAT SERPL-MCNC: 1.23 MG/DL (ref 0.76–1.27)
EGFRCR-CYS SERPLBLD CKD-EPI 2021: 65 ML/MIN/1.73
EOSINOPHIL # BLD AUTO: 0.2 X10E3/UL (ref 0–0.4)
EOSINOPHIL NFR BLD AUTO: 3 %
ERYTHROCYTE [DISTWIDTH] IN BLOOD BY AUTOMATED COUNT: 13 % (ref 11.6–15.4)
GLOBULIN SER CALC-MCNC: 2.4 G/DL (ref 1.5–4.5)
GLUCOSE SERPL-MCNC: 108 MG/DL (ref 65–99)
HCT VFR BLD AUTO: 43.5 % (ref 37.5–51)
HDLC SERPL-MCNC: 34 MG/DL
HGB BLD-MCNC: 14.9 G/DL (ref 13–17.7)
IMM GRANULOCYTES # BLD AUTO: 0 X10E3/UL (ref 0–0.1)
IMM GRANULOCYTES NFR BLD AUTO: 0 %
LDLC SERPL CALC-MCNC: 79 MG/DL (ref 0–99)
LYMPHOCYTES # BLD AUTO: 1.2 X10E3/UL (ref 0.7–3.1)
LYMPHOCYTES NFR BLD AUTO: 16 %
MCH RBC QN AUTO: 30.6 PG (ref 26.6–33)
MCHC RBC AUTO-ENTMCNC: 34.3 G/DL (ref 31.5–35.7)
MCV RBC AUTO: 89 FL (ref 79–97)
MONOCYTES # BLD AUTO: 0.7 X10E3/UL (ref 0.1–0.9)
MONOCYTES NFR BLD AUTO: 10 %
NEUTROPHILS # BLD AUTO: 5 X10E3/UL (ref 1.4–7)
NEUTROPHILS NFR BLD AUTO: 70 %
PLATELET # BLD AUTO: 262 X10E3/UL (ref 150–450)
POTASSIUM SERPL-SCNC: 5.3 MMOL/L (ref 3.5–5.2)
PROT SERPL-MCNC: 6.7 G/DL (ref 6–8.5)
RBC # BLD AUTO: 4.87 X10E6/UL (ref 4.14–5.8)
SODIUM SERPL-SCNC: 143 MMOL/L (ref 134–144)
TRIGL SERPL-MCNC: 53 MG/DL (ref 0–149)
TSH SERPL DL<=0.005 MIU/L-ACNC: 1.3 UIU/ML (ref 0.45–4.5)
VLDLC SERPL CALC-MCNC: 12 MG/DL (ref 5–40)
WBC # BLD AUTO: 7.2 X10E3/UL (ref 3.4–10.8)

## 2022-09-19 ENCOUNTER — OFFICE VISIT (OUTPATIENT)
Dept: FAMILY MEDICINE CLINIC | Facility: CLINIC | Age: 67
End: 2022-09-19

## 2022-09-19 VITALS
BODY MASS INDEX: 35.22 KG/M2 | HEART RATE: 57 BPM | OXYGEN SATURATION: 98 % | WEIGHT: 246 LBS | TEMPERATURE: 97.5 F | DIASTOLIC BLOOD PRESSURE: 78 MMHG | HEIGHT: 70 IN | SYSTOLIC BLOOD PRESSURE: 129 MMHG

## 2022-09-19 DIAGNOSIS — Z00.00 ENCOUNTER FOR PREVENTIVE HEALTH EXAMINATION: ICD-10-CM

## 2022-09-19 DIAGNOSIS — Z91.09 ENVIRONMENTAL ALLERGIES: ICD-10-CM

## 2022-09-19 DIAGNOSIS — B00.52 HERPES SIMPLEX KERATITIS: ICD-10-CM

## 2022-09-19 DIAGNOSIS — I10 BENIGN ESSENTIAL HTN: ICD-10-CM

## 2022-09-19 DIAGNOSIS — E78.2 MIXED HYPERLIPIDEMIA: ICD-10-CM

## 2022-09-19 DIAGNOSIS — Z00.00 MEDICARE ANNUAL WELLNESS VISIT, SUBSEQUENT: Primary | ICD-10-CM

## 2022-09-19 DIAGNOSIS — H93.13 TINNITUS OF BOTH EARS: ICD-10-CM

## 2022-09-19 DIAGNOSIS — N40.0 BENIGN PROSTATIC HYPERPLASIA, UNSPECIFIED WHETHER LOWER URINARY TRACT SYMPTOMS PRESENT: ICD-10-CM

## 2022-09-19 DIAGNOSIS — D36.9 ADENOMATOUS POLYP: ICD-10-CM

## 2022-09-19 DIAGNOSIS — H18.30 CORNEAL EPITHELIAL DEFECT: ICD-10-CM

## 2022-09-19 DIAGNOSIS — Z23 NEED FOR TETANUS BOOSTER: ICD-10-CM

## 2022-09-19 DIAGNOSIS — E66.01 MORBIDLY OBESE: ICD-10-CM

## 2022-09-19 DIAGNOSIS — J30.2 SEASONAL ALLERGIES: ICD-10-CM

## 2022-09-19 PROCEDURE — 99397 PER PM REEVAL EST PAT 65+ YR: CPT

## 2022-09-19 PROCEDURE — 90471 IMMUNIZATION ADMIN: CPT

## 2022-09-19 PROCEDURE — 90715 TDAP VACCINE 7 YRS/> IM: CPT

## 2022-09-19 RX ORDER — PREDNISOLONE ACETATE 10 MG/ML
SUSPENSION/ DROPS OPHTHALMIC
COMMUNITY
Start: 2022-08-11 | End: 2023-03-20

## 2022-09-19 RX ORDER — CETIRIZINE HYDROCHLORIDE 10 MG/1
10 TABLET ORAL DAILY
Qty: 30 TABLET | Refills: 5 | Status: SHIPPED | OUTPATIENT
Start: 2022-09-19 | End: 2022-10-11

## 2022-09-19 RX ORDER — ACYCLOVIR 400 MG/1
400 TABLET ORAL DAILY
COMMUNITY
Start: 2022-08-29

## 2022-09-19 RX ORDER — SODIUM FLUORIDE 1.1 G/100G
GEL, DENTIFRICE ORAL
COMMUNITY
Start: 2022-09-06

## 2022-09-19 RX ORDER — PREDNISOLONE ACETATE 10 MG/ML
1 SUSPENSION/ DROPS OPHTHALMIC EVERY 6 HOURS
COMMUNITY
Start: 2022-05-09 | End: 2022-09-19 | Stop reason: SDUPTHER

## 2022-09-19 NOTE — ASSESSMENT & PLAN NOTE
Patient reports tinnitus in both ears.  We discussed controlling allergy symptoms to prevent this occurrence.  We will refer to audiology if symptoms persist at next visit or sooner if needed.

## 2022-09-19 NOTE — ASSESSMENT & PLAN NOTE
Hypertension is controlled with medication and diet and lifestyle.  Advised patient to begin checking blood pressure at home and bring the log in at the next appointment.

## 2022-09-19 NOTE — PROGRESS NOTES
The ABCs of the Annual Wellness Visit  Subsequent Medicare Wellness Visit    Chief Complaint   Patient presents with   • Hyperlipidemia   • Medicare Wellness-subsequent   Patient is here for his annual Medicare wellness visit.  He feels well, no major complaints.  He is seeing cardiology and has an appointment in January next year.  Regarding his blood pressure he does not check his blood pressure at home I advised him to recheck it and bring in the log at the next appointment.    Anticipatory guidance was given immunizations were discussed he agrees to receive the tetanus booster today and will go to the pharmacy for the latest COVID booster.  He understands the shots need to be completed on the same day or wait 30 days between shots.  He is up-to-date on pneumonia vaccine and shingles.    Overall he feels well although he does note some ringing in his ears bilaterally.  We discussed allergies and controlling the symptoms to help decrease tinnitus.  He does not take daily allergy medicine but agrees to begin taking cetirizine 10 mg daily.  Upon discussion and conversation with him he does appear nasally congested.  He notes an occasional cough associated with postnasal drip.      He sees gastroenterology Dr. Park for management of colon cancer risk.  His last colonoscopy was completed with 5 polyps found his next one is due March 2023.  He denies any nausea vomiting diarrhea constipation no black stools no bright red blood in his stools.    He sees Dr. Rosendo Bradley urology routinely.    He notes an occasional issue with memory.  We discussed lifestyle management to control including exercise and crossword puzzles and brain activity.  He retires next year from [employer].  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  He is a very active gentleman.    Labs were recently completed at the last visit he agrees to return for medication refill in 6 months.          Subjective    History of Present Illness:  Sergei Vance  is a 67 y.o. male who presents for a Subsequent Medicare Wellness Visit.    The following portions of the patient's history were reviewed and   updated as appropriate: allergies, current medications, past family history, past medical history, past social history, past surgical history and problem list.    Compared to one year ago, the patient feels his physical   health is the same.    Compared to one year ago, the patient feels his mental   health is the same.    Recent Hospitalizations:  He was not admitted to the hospital during the last year.       Current Medical Providers:  Patient Care Team:  Tere Clemente APRN as PCP - General (Nurse Practitioner)    Outpatient Medications Prior to Visit   Medication Sig Dispense Refill   • acyclovir (ZOVIRAX) 400 MG tablet Take 400 mg by mouth Daily.     • amLODIPine (NORVASC) 10 MG tablet TAKE 1 TABLET BY MOUTH EVERY DAY 90 tablet 3   • atorvastatin (Lipitor) 40 MG tablet Take 1 tablet by mouth Daily. 90 tablet 3   • lisinopril-hydrochlorothiazide (PRINZIDE,ZESTORETIC) 20-12.5 MG per tablet Take 2 tablets by mouth Daily. 180 tablet 3   • omeprazole (priLOSEC) 40 MG capsule TAKE 1 CAPSULE BY MOUTH EVERY DAY 90 capsule 3   • prednisoLONE acetate (PRED FORTE) 1 % ophthalmic suspension Apply 1 drop to eye(s) as directed by provider Every 6 (Six) Hours.     • Denta 5000 Plus 1.1 % cream USE TO BRUSH TWICE DAILY     • prednisoLONE acetate (PRED FORTE) 1 % ophthalmic suspension INSTILL 1 DROP INTO AFFECTED EYE 4 TIMES A DAY     • meclizine (ANTIVERT) 25 MG tablet Take 1 tablet by mouth 3 (Three) Times a Day As Needed for Dizziness. 30 tablet 0     No facility-administered medications prior to visit.       No opioid medication identified on active medication list. I have reviewed chart for other potential  high risk medication/s and harmful drug interactions in the elderly.          Aspirin is not on active medication list.  Aspirin use is not indicated based on review of  current medical condition/s. Risk of harm outweighs potential benefits.  .    Patient Active Problem List   Diagnosis   • Benign prostatic hyperplasia   • Gastroesophageal reflux disease   • Mixed hyperlipidemia   • Benign essential HTN   • Left axis deviation   • Renal insufficiency, mild   • Residual hemorrhoidal skin tags   • Prostate cancer (HCC)   • Adenomatous polyp   • Allergic conjunctivitis   • Encounter for preventive health examination   • Morbidly obese (HCC)   • Dizziness   • Palpitations   • Subconjunctival hemorrhage, non-traumatic, left   • Corneal epithelial defect   • Herpes simplex keratitis   • Medicare annual wellness visit, subsequent   • Need for tetanus booster   • Tinnitus of both ears   • Environmental allergies   • Seasonal allergies     Advance Care Planning  Advance Directive is on file.  ACP discussion was held with the patient during this visit. Patient has an advance directive in EMR which is still valid.     Review of Systems   Constitutional: Negative for activity change, appetite change, chills, fatigue and fever.   HENT: Positive for congestion and postnasal drip. Negative for sore throat.    Eyes: Negative.    Respiratory: Negative for cough and shortness of breath.    Cardiovascular: Negative for chest pain, palpitations and leg swelling.   Gastrointestinal: Negative for constipation, diarrhea, nausea and vomiting.        Gerd   Endocrine: Negative.    Genitourinary: Negative for difficulty urinating and urgency.   Musculoskeletal: Negative for arthralgias.   Skin: Negative.    Allergic/Immunologic: Negative for environmental allergies.   Neurological: Negative for dizziness, weakness, numbness and confusion.   Psychiatric/Behavioral: Negative for dysphoric mood, self-injury, sleep disturbance and suicidal ideas.        Objective    Vitals:    09/19/22 1058   BP: 129/78   BP Location: Right arm   Patient Position: Sitting   Cuff Size: Adult   Pulse: 57   Temp: 97.5 °F (36.4 °C)  "  TempSrc: Infrared   SpO2: 98%   Weight: 112 kg (246 lb)   Height: 177.8 cm (70\")     Estimated body mass index is 35.3 kg/m² as calculated from the following:    Height as of this encounter: 177.8 cm (70\").    Weight as of this encounter: 112 kg (246 lb).    Class 2 Severe Obesity (BMI >=35 and <=39.9). Obesity-related health conditions include the following: hypertension, coronary heart disease, dyslipidemias, GERD and osteoarthritis. Obesity is improving with treatment. BMI is is above average; no BMI management plan is appropriate. We discussed portion control, increasing exercise, management of depression/anxiety/stress to control compensatory eating and decreasing alcohol consumption.      Does the patient have evidence of cognitive impairment? No    Physical Exam  Vitals and nursing note reviewed.   Constitutional:       General: He is not in acute distress.     Appearance: Normal appearance. He is well-groomed. He is obese.   HENT:      Right Ear: Tympanic membrane, ear canal and external ear normal. There is no impacted cerumen.      Left Ear: Tympanic membrane, ear canal and external ear normal. There is no impacted cerumen.      Nose: Nose normal.      Mouth/Throat:      Mouth: Mucous membranes are moist.   Eyes:      General: Lids are normal. Vision grossly intact.   Neck:      Thyroid: No thyroid mass, thyromegaly or thyroid tenderness.      Vascular: No carotid bruit.   Cardiovascular:      Rate and Rhythm: Normal rate and regular rhythm.      Pulses: Normal pulses.      Heart sounds: Normal heart sounds.   Pulmonary:      Effort: Pulmonary effort is normal.      Breath sounds: Normal breath sounds.   Abdominal:      General: Bowel sounds are normal.      Palpations: Abdomen is soft. Abdomen is not rigid.      Tenderness: There is no abdominal tenderness. There is no right CVA tenderness or left CVA tenderness.   Musculoskeletal:      Cervical back: Normal range of motion and neck supple.      Right " lower leg: No edema.      Left lower leg: No edema.   Lymphadenopathy:      Cervical: No cervical adenopathy.   Skin:     General: Skin is warm and dry.      Capillary Refill: Capillary refill takes less than 2 seconds.   Neurological:      General: No focal deficit present.      Mental Status: He is alert and oriented to person, place, and time.   Psychiatric:         Attention and Perception: Attention and perception normal.         Mood and Affect: Mood normal.         Speech: Speech normal.         Behavior: Behavior normal. Behavior is cooperative.         Thought Content: Thought content normal.         Cognition and Memory: Cognition and memory normal.         Judgment: Judgment normal.       Lab Results   Component Value Date    CHLPL 125 2022    TRIG 53 2022    HDL 34 (L) 2022    LDL 79 2022    VLDL 12 2022            HEALTH RISK ASSESSMENT    Smoking Status:  Social History     Tobacco Use   Smoking Status Former Smoker   • Packs/day: 1.00   • Years: 39.00   • Pack years: 39.00   • Types: Cigarettes   • Start date:    • Quit date:    • Years since quittin.7   Smokeless Tobacco Former User   • Types: Chew   • Quit date:      Alcohol Consumption:  Social History     Substance and Sexual Activity   Alcohol Use Yes   • Alcohol/week: 2.0 standard drinks   • Types: 2 Cans of beer per week     Fall Risk Screen:    STEADI Fall Risk Assessment was completed, and patient is at LOW risk for falls.Assessment completed on:2022    Depression Screening:  PHQ-2/PHQ-9 Depression Screening 2022   Retired Total Score -   Little Interest or Pleasure in Doing Things 0-->not at all   Feeling Down, Depressed or Hopeless 0-->not at all   PHQ-9: Brief Depression Severity Measure Score 0       Health Habits and Functional and Cognitive Screening:  Functional & Cognitive Status 2022   Do you have difficulty preparing food and eating? No   Do you have difficulty bathing  yourself, getting dressed or grooming yourself? No   Do you have difficulty using the toilet? No   Do you have difficulty moving around from place to place? No   Do you have trouble with steps or getting out of a bed or a chair? No   Current Diet Well Balanced Diet   Dental Exam Up to date   Eye Exam Up to date   Exercise (times per week) 2 times per week   Current Exercises Include Treadmill   Do you need help using the phone?  No   Are you deaf or do you have serious difficulty hearing?  No   Do you need help with transportation? No   Do you need help shopping? No   Do you need help preparing meals?  No   Do you need help with housework?  No   Do you need help with laundry? No   Do you need help taking your medications? No   Do you need help managing money? No   Do you ever drive or ride in a car without wearing a seat belt? No   Have you felt unusual stress, anger or loneliness in the last month? No   Who do you live with? Spouse   If you need help, do you have trouble finding someone available to you? No   Have you been bothered in the last four weeks by sexual problems? No   Do you have difficulty concentrating, remembering or making decisions? No       Age-appropriate Screening Schedule:  Refer to the list below for future screening recommendations based on patient's age, sex and/or medical conditions. Orders for these recommended tests are listed in the plan section. The patient has been provided with a written plan.    Health Maintenance   Topic Date Due   • INFLUENZA VACCINE  10/01/2022   • ZOSTER VACCINE (2 of 2) 10/06/2022   • LIPID PANEL  09/12/2023   • TDAP/TD VACCINES (2 - Td or Tdap) 09/19/2032              Assessment & Plan   CMS Preventative Services Quick Reference  Risk Factors Identified During Encounter  Cardiovascular Disease  Dementia/Memory   Hearing Problem  Immunizations Discussed/Encouraged (specific Immunizations; Td and COVID19  Obesity/Overweight   The above risks/problems have been  discussed with the patient.  Follow up actions/plans if indicated are seen below in the Assessment/Plan Section.  Pertinent information has been shared with the patient in the After Visit Summary.    Diagnoses and all orders for this visit:    1. Medicare annual wellness visit, subsequent (Primary)    2. Benign essential HTN  Assessment & Plan:  Hypertension is controlled with medication and diet and lifestyle.  Advised patient to begin checking blood pressure at home and bring the log in at the next appointment.      3. Mixed hyperlipidemia  Assessment & Plan:  Continue low-fat diet, exercise and medication use        4. Need for tetanus booster  Assessment & Plan:  Given in office today.      5. Tinnitus of both ears  Assessment & Plan:  Patient reports tinnitus in both ears.  We discussed controlling allergy symptoms to prevent this occurrence.  We will refer to audiology if symptoms persist at next visit or sooner if needed.      6. Environmental allergies    7. Morbidly obese (HCC)  Assessment & Plan:  Patient's (Body mass index is 35.3 kg/m².) indicates that they are morbidly obese (BMI > 40 or > 35 with obesity - related health condition) with obesity-related health conditions that include hypertension, dyslipidemias and GERD . Obesity is unchanged. BMI is is above average; BMI management plan is completed. We discussed portion control and increasing exercise.       8. Seasonal allergies    9. Herpes simplex keratitis  Assessment & Plan:  Continue plan of care as directed by ophthalmology sees Dr. Mancia's office.      10. Corneal epithelial defect  Assessment & Plan:  Continue plan of care as directed by ophthalmology.  Sees Dr. Mancia's office.      11. Benign prostatic hyperplasia, unspecified whether lower urinary tract symptoms present  Assessment & Plan:  Managed by urology Dr. Rosendo Bradley.      12. Encounter for preventive health examination    13. Adenomatous polyp  Assessment & Plan:  5 polyps noted at  last colonoscopy.  Next colonoscopy due March 2023.  Patient denies any constipation, black tarry stools or bright red blood in stools.      Other orders  -     Tdap Vaccine Greater Than or Equal To 8yo IM  -     cetirizine (zyrTEC) 10 MG tablet; Take 1 tablet by mouth Daily for 30 days.  Dispense: 30 tablet; Refill: 5      Follow Up:   Return in about 6 months (around 3/19/2023) for med refill.     An After Visit Summary and PPPS were made available to the patient.

## 2022-09-19 NOTE — ASSESSMENT & PLAN NOTE
5 polyps noted at last colonoscopy.  Next colonoscopy due March 2023.  Patient denies any constipation, black tarry stools or bright red blood in stools.

## 2022-09-19 NOTE — PATIENT INSTRUCTIONS
Continue current plan of care as discussed.   Take medication as ordered (if applicable).    Practice good sleep hygiene.  Eat a well balanced diet with fresh fruit and vegetables.    Drink at least 8 bottles of water or equivalent per day.     Limit sweetened beverages, sodas, juices.    Bake, boil, broil or grill your food, avoid eating fried foods.   Exercise at least 150 minutes per week.

## 2022-09-19 NOTE — ASSESSMENT & PLAN NOTE
Patient's (Body mass index is 35.3 kg/m².) indicates that they are morbidly obese (BMI > 40 or > 35 with obesity - related health condition) with obesity-related health conditions that include hypertension, dyslipidemias and GERD . Obesity is unchanged. BMI is is above average; BMI management plan is completed. We discussed portion control and increasing exercise.

## 2022-10-11 RX ORDER — CETIRIZINE HYDROCHLORIDE 10 MG/1
TABLET ORAL
Qty: 30 TABLET | Refills: 5 | Status: SHIPPED | OUTPATIENT
Start: 2022-10-11

## 2022-11-21 ENCOUNTER — TELEPHONE (OUTPATIENT)
Dept: FAMILY MEDICINE CLINIC | Facility: CLINIC | Age: 67
End: 2022-11-21

## 2022-11-21 NOTE — TELEPHONE ENCOUNTER
D    Caller: Sergei Vance    Relationship to patient: Self    Best call back number:    [phone]        Date of covid test: 11/21/22        COVID19 symptoms: COUGH/FEELS BAD, RUNNY NOSE    Date of initial quarantine: 11/21/22    Additional information or concerns: PATIENT IS WANTING TO KNOW WHAT [facility] RECOMMENDS.  HE STATES HE WILL ALSO NEED AN EXCUSE FROM WORK.     What is the patients preferred pharmacy:              [facility]/pharmacy #[id number] - [place], [place] - [address] - [phone] [facility] [phone]   [phone]     Please advise.

## 2023-01-12 NOTE — PROGRESS NOTES
Date of Office Visit: 2023  Encounter Provider: Dr. Edvin Guardado  Place of Service: [facility] [place]  Patient Name: Sergei Vance  :1955  Tere Clemente APRN    Chief Complaint   Patient presents with   • Palpitations   • Hypertension   • Hyperlipidemia   • Follow-up     History of Present Illness    IM pleased to see Mr. Vance in my office today as a follow-up.    As you know, patient is 67-year-old white gentleman whose past medical history significant for hypertension, hyperlipidemia, who came today for follow-up.    In 2021, patient was seen by Linsey Miller in [place] office for symptom of dizziness and lightheadedness.  Patient underwent Holter monitor which showed predominantly sinus rhythm with minimum heart rate of 40 and a maximum heart rate of 100 bpm.  An average heart rate of 69 bpm.  Short run of nonspecific paroxysmal atrial tachycardia was noted.  No significant pause of bradycardia or tachyarrhythmia was noted.  Patient underwent stress testing which she walked for total of 9 minutes and 38 seconds.  Cardiolite imaging showed no ischemia or myocardial infarction.  Echocardiogram showed preserved left ventricle function with a EF of 60 to 65%.  No valvular heart disease noted.    Since the previous visit, patient is overall doing well.  Patient has not had any further episode of dizziness and lightheadedness.  No palpitation.  No chest pain or tightness or heaviness.  No orthopnea PND no syncope or presyncope.  No leg edema noted.    EKG showed normal sinus rhythm.  No ST changes.    At this stage, I would recommend to continue current treatment his blood pressure is very well controlled.  Patient has no active symptom of dizziness and lightheadedness.  I will see the patient as needed or in 1 year.      Past Medical History:   Diagnosis Date   • Abnormal stress test    • Adenomatous polyp 3/23/2020   • BPH (benign prostatic hyperplasia)    •  Elevated PSA    • GERD (gastroesophageal reflux disease)    • Hyperlipidemia    • Hypertension    • Prostate cancer (HCC)    • Renal insufficiency          Past Surgical History:   Procedure Laterality Date   • VASECTOMY             Current Outpatient Medications:   •  acyclovir (ZOVIRAX) 400 MG tablet, Take 400 mg by mouth Daily., Disp: , Rfl:   •  amLODIPine (NORVASC) 10 MG tablet, TAKE 1 TABLET BY MOUTH EVERY DAY, Disp: 90 tablet, Rfl: 3  •  atorvastatin (Lipitor) 40 MG tablet, Take 1 tablet by mouth Daily., Disp: 90 tablet, Rfl: 3  •  cetirizine (zyrTEC) 10 MG tablet, TAKE 1 TABLET BY MOUTH EVERY DAY, Disp: 30 tablet, Rfl: 5  •  Denta 5000 Plus 1.1 % cream, USE TO BRUSH TWICE DAILY, Disp: , Rfl:   •  lisinopril-hydrochlorothiazide (PRINZIDE,ZESTORETIC) 20-12.5 MG per tablet, Take 2 tablets by mouth Daily., Disp: 180 tablet, Rfl: 3  •  omeprazole (priLOSEC) 40 MG capsule, TAKE 1 CAPSULE BY MOUTH EVERY DAY, Disp: 90 capsule, Rfl: 3  •  prednisoLONE acetate (PRED FORTE) 1 % ophthalmic suspension, INSTILL 1 DROP INTO AFFECTED EYE 4 TIMES A DAY, Disp: , Rfl:       Social History     Socioeconomic History   • Marital status:    Tobacco Use   • Smoking status: Former     Packs/day: 1.00     Years: 39.00     Pack years: 39.00     Types: Cigarettes     Start date:      Quit date:      Years since quittin.0   • Smokeless tobacco: Former     Types: Chew     Quit date:    Vaping Use   • Vaping Use: Never used   Substance and Sexual Activity   • Alcohol use: Yes     Alcohol/week: 2.0 standard drinks     Types: 2 Cans of beer per week   • Drug use: No   • Sexual activity: Defer         Review of Systems   Constitutional: Negative for chills and fever.   HENT: Negative for ear discharge and nosebleeds.    Eyes: Negative for discharge and redness.   Cardiovascular: Negative for chest pain, orthopnea, palpitations, paroxysmal nocturnal dyspnea and syncope.   Respiratory: Negative for cough, shortness of  "breath and wheezing.    Endocrine: Negative for heat intolerance.   Skin: Negative for rash.   Musculoskeletal: Negative for arthritis and myalgias.   Gastrointestinal: Negative for abdominal pain, melena, nausea and vomiting.   Genitourinary: Negative for dysuria and hematuria.   Neurological: Negative for dizziness, light-headedness, numbness and tremors.   Psychiatric/Behavioral: Negative for depression. The patient is not nervous/anxious.        Procedures      ECG 12 Lead    Date/Time: 1/13/2023 10:33 AM  Performed by: Edvin Guardado MD  Authorized by: Edvin Guardado MD   Comparison: compared with previous ECG   Similar to previous ECG  Rhythm: sinus rhythm    Clinical impression: normal ECG            ECG 12 Lead    (Results Pending)           Objective:    /85 (BP Location: Right arm, Patient Position: Sitting, Cuff Size: Large Adult)   Pulse 64   Ht 177.8 cm (70\")   Wt 111 kg (245 lb)   SpO2 98%   BMI 35.15 kg/m²         Constitutional:       Appearance: Well-developed.   Eyes:      General: No scleral icterus.        Right eye: No discharge.   HENT:      Head: Normocephalic and atraumatic.   Neck:      Thyroid: No thyromegaly.      Lymphadenopathy: No cervical adenopathy.   Pulmonary:      Effort: Pulmonary effort is normal. No respiratory distress.      Breath sounds: Normal breath sounds. No wheezing. No rales.   Cardiovascular:      Normal rate. Regular rhythm.      No gallop.   Abdominal:      Tenderness: There is no abdominal tenderness.   Skin:     Findings: No erythema or rash.   Neurological:      Mental Status: Alert and oriented to person, place, and time.             Assessment:       Diagnosis Plan   1. Mixed hyperlipidemia  ECG 12 Lead      2. Benign essential HTN  ECG 12 Lead      3. Palpitations  ECG 12 Lead               Plan:       MDM:    1.  Palpitation:    No further symptom of palpitation.  Continue to observe    2.  Hypertension:    Blood pressure is very well " controlled.    3.  Dizziness:    His symptom of dizziness have not recurred.  Patient may have vertigo at that time.    4.  Hyperlipidemia:    Patient is on Lipitor.  Repeat lipid panel testing.  Recent LDL is 49 which is desirable

## 2023-01-13 ENCOUNTER — OFFICE VISIT (OUTPATIENT)
Dept: CARDIOLOGY | Facility: CLINIC | Age: 68
End: 2023-01-13
Payer: COMMERCIAL

## 2023-01-13 VITALS
HEIGHT: 70 IN | SYSTOLIC BLOOD PRESSURE: 137 MMHG | BODY MASS INDEX: 35.07 KG/M2 | HEART RATE: 64 BPM | OXYGEN SATURATION: 98 % | WEIGHT: 245 LBS | DIASTOLIC BLOOD PRESSURE: 85 MMHG

## 2023-01-13 DIAGNOSIS — I10 BENIGN ESSENTIAL HTN: ICD-10-CM

## 2023-01-13 DIAGNOSIS — R00.2 PALPITATIONS: ICD-10-CM

## 2023-01-13 DIAGNOSIS — E78.2 MIXED HYPERLIPIDEMIA: Primary | ICD-10-CM

## 2023-01-13 PROCEDURE — 93000 ELECTROCARDIOGRAM COMPLETE: CPT | Performed by: INTERNAL MEDICINE

## 2023-01-13 PROCEDURE — 99214 OFFICE O/P EST MOD 30 MIN: CPT | Performed by: INTERNAL MEDICINE

## 2023-02-27 DIAGNOSIS — I10 ESSENTIAL HYPERTENSION: ICD-10-CM

## 2023-02-28 RX ORDER — LISINOPRIL AND HYDROCHLOROTHIAZIDE 20; 12.5 MG/1; MG/1
2 TABLET ORAL DAILY
Qty: 180 TABLET | Refills: 3 | Status: SHIPPED | OUTPATIENT
Start: 2023-02-28

## 2023-03-20 ENCOUNTER — OFFICE VISIT (OUTPATIENT)
Dept: FAMILY MEDICINE CLINIC | Facility: CLINIC | Age: 68
End: 2023-03-20
Payer: COMMERCIAL

## 2023-03-20 VITALS
HEART RATE: 58 BPM | DIASTOLIC BLOOD PRESSURE: 84 MMHG | TEMPERATURE: 98.1 F | OXYGEN SATURATION: 98 % | SYSTOLIC BLOOD PRESSURE: 136 MMHG | WEIGHT: 254 LBS | RESPIRATION RATE: 16 BRPM | BODY MASS INDEX: 36.36 KG/M2 | HEIGHT: 70 IN

## 2023-03-20 DIAGNOSIS — F41.9 ANXIETY: ICD-10-CM

## 2023-03-20 DIAGNOSIS — Z77.098 OCCUPATIONAL EXPOSURE TO CHEMICALS: ICD-10-CM

## 2023-03-20 DIAGNOSIS — I10 UNCONTROLLED HYPERTENSION: Primary | ICD-10-CM

## 2023-03-20 DIAGNOSIS — N52.9 ERECTILE DYSFUNCTION, UNSPECIFIED ERECTILE DYSFUNCTION TYPE: ICD-10-CM

## 2023-03-20 DIAGNOSIS — R05.9 COUGH IN ADULT: ICD-10-CM

## 2023-03-20 DIAGNOSIS — Z87.891 HISTORY OF TOBACCO USE: ICD-10-CM

## 2023-03-20 RX ORDER — MONTELUKAST SODIUM 10 MG/1
10 TABLET ORAL NIGHTLY
Qty: 90 TABLET | Refills: 3 | Status: SHIPPED | OUTPATIENT
Start: 2023-03-20

## 2023-03-20 RX ORDER — HYDROXYZINE HYDROCHLORIDE 25 MG/1
25 TABLET, FILM COATED ORAL 3 TIMES DAILY PRN
Qty: 30 TABLET | Refills: 0 | Status: SHIPPED | OUTPATIENT
Start: 2023-03-20

## 2023-03-20 RX ORDER — ALBUTEROL SULFATE 90 UG/1
2 AEROSOL, METERED RESPIRATORY (INHALATION) EVERY 4 HOURS PRN
Qty: 6.7 G | Refills: 10 | Status: SHIPPED | OUTPATIENT
Start: 2023-03-20

## 2023-03-20 NOTE — PROGRESS NOTES
Sergei Vance is a 68 y.o. male. Presents to [facility] for   Chief Complaint   Patient presents with   • Hyperlipidemia       Sergei Vance's last appointment at this office was Visit date not found and last appointment with me was on 11/21/2022.     Rooming Tab(CC,VS,Pt Hx,Fall Screen)    I have reviewed and updated his medications, medical history and problem list during today's office visit.     Patient Care Team:  Tere Clemente APRN as PCP - General (Nurse Practitioner)    Problem List Tab  Medications Tab  Synopsis Tab  Chart Review Tab  Care Everywhere Tab  Immunizations Tab  Patient History Tab    SUBJECTIVE:    History of Present Illness  Patient is here for a follow up exam for hypertension.      Hypertension -   His b/p is elevated today at 163/84 in the office.  Recheck 136/84.  He reports not taking his blood pressure medication yesterday.   Encouraged daily use of medication.  Avoid excess salt in diet.  Encourage healthy heart, low fat diet, water hydration  Avoid tobacco, caffeine, alcohol use.   Regular exercise encouraged.    Cough -   Loose sounding smokers type cough noted on exam.  History of 39-pack-year smoking.  Low-dose lung CT ordered.  Albuterol inhaler given.  Singulair prescription ordered.    History of environmental allergies.    Anxiety -   Patient reports occasional anxiety.    No suicidal thoughts or ideations.    Hydroxyzine prescription given.     All questions answered patient agrees with plan of care will follow-up at next appointment.             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]    • Corneal epithelial defect [H18.30]    •  Dizziness [R42]    • Palpitations [R00.2]    • Encounter for preventive health examination [Z00.00]    • Morbidly obese (HCC) [E66.01]    • Allergic conjunctivitis [H10.10]    • Adenomatous polyp [D36.9]    • Residual hemorrhoidal skin tags [K64.4]    • Prostate cancer (HCC) [C61]    • Renal insufficiency, mild [N28.9]    • Left axis deviation [R94.31]    • Gastroesophageal reflux disease [K21.9]    • Benign prostatic hyperplasia [N40.0]    • Mixed hyperlipidemia [E78.2]    • Benign essential HTN [I10]        No Known Allergies    Medication List:    Current Outpatient Medications:   •  acyclovir (ZOVIRAX) 400 MG tablet, Take 1 tablet by mouth Daily., Disp: , Rfl:   •  amLODIPine (NORVASC) 10 MG tablet, TAKE 1 TABLET BY MOUTH EVERY DAY, Disp: 90 tablet, Rfl: 3  •  atorvastatin (Lipitor) 40 MG tablet, Take 1 tablet by mouth Daily., Disp: 90 tablet, Rfl: 3  •  cetirizine (zyrTEC) 10 MG tablet, TAKE 1 TABLET BY MOUTH EVERY DAY, Disp: 30 tablet, Rfl: 5  •  Denta 5000 Plus 1.1 % cream, USE TO BRUSH TWICE DAILY, Disp: , Rfl:   •  lisinopril-hydrochlorothiazide (PRINZIDE,ZESTORETIC) 20-12.5 MG per tablet, TAKE 2 TABLETS BY MOUTH DAILY, Disp: 180 tablet, Rfl: 3  •  omeprazole (priLOSEC) 40 MG capsule, TAKE 1 CAPSULE BY MOUTH EVERY DAY, Disp: 90 capsule, Rfl: 3  •  albuterol sulfate  (90 Base) MCG/ACT inhaler, Inhale 2 puffs Every 4 (Four) Hours As Needed for Wheezing., Disp: 6.7 g, Rfl: 10  •  hydrOXYzine (ATARAX) 25 MG tablet, Take 1 tablet by mouth 3 (Three) Times a Day As Needed for Itching or Anxiety., Disp: 30 tablet, Rfl: 0  •  montelukast (Singulair) 10 MG tablet, Take 1 tablet by mouth Every Night., Disp: 90 tablet, Rfl: 3    Review of Systems   Constitutional: Negative for chills, fatigue and fever.   Respiratory: Positive for cough and shortness of breath. Negative for wheezing.    Cardiovascular: Negative for chest pain, palpitations and leg swelling.   Genitourinary: Positive for erectile dysfunction.  "  Psychiatric/Behavioral: Positive for decreased concentration. Negative for depressed mood.        Recent loss of mother due to dementia.        Past Medical History:   Diagnosis Date   • Adenomatous polyp 3/23/2020   • Abnormal stress test    • BPH (benign prostatic hyperplasia)    • Elevated PSA    • GERD (gastroesophageal reflux disease)    • Hyperlipidemia    • Hypertension    • Prostate cancer (HCC)    • Renal insufficiency        Past Medical History Pertinent Negatives:   Diagnosis Date Noted   • Heart murmur 2021   • Sleep apnea 2021       Past Surgical History:   Procedure Laterality Date   • VASECTOMY         Social History     Socioeconomic History   • Marital status:    Tobacco Use   • Smoking status: Former     Packs/day: 1.00     Years: 39.00     Pack years: 39.00     Types: Cigarettes     Start date:      Quit date:      Years since quittin.2   • Smokeless tobacco: Former     Types: Chew     Quit date:    Vaping Use   • Vaping Use: Never used   Substance and Sexual Activity   • Alcohol use: Yes     Alcohol/week: 2.0 standard drinks     Types: 2 Cans of beer per week   • Drug use: No   • Sexual activity: Defer       Family History   Problem Relation Age of Onset   • Arrhythmia Mother    • No Known Problems Father    • Colon cancer Maternal Grandmother    • Prostate cancer Maternal Grandfather        OBJECTIVE:    Vitals:    23 1301 23 1334   BP: 163/84 136/84   BP Location: Right arm Right arm   Patient Position: Sitting Sitting   Cuff Size: Large Adult Large Adult   Pulse: 58    Resp: 16    Temp: 98.1 °F (36.7 °C)    TempSrc: Infrared    SpO2: 98%    Weight: 115 kg (254 lb)    Height: 177.8 cm (70\")        Estimated body mass index is 36.45 kg/m² as calculated from the following:    Height as of this encounter: 177.8 cm (70\").    Weight as of this encounter: 115 kg (254 lb).    Physical Exam  Vitals and nursing note reviewed.   Constitutional:       " Appearance: Normal appearance. He is well-developed. He is not ill-appearing.   Cardiovascular:      Rate and Rhythm: Normal rate and regular rhythm.      Heart sounds: Normal heart sounds. No murmur heard.    No friction rub. No gallop.   Pulmonary:      Effort: Pulmonary effort is normal.      Breath sounds: Normal breath sounds.      Comments: Loose sounding cough. Non productive.   Abdominal:      General: Bowel sounds are normal.      Palpations: Abdomen is soft.      Tenderness: There is no abdominal tenderness.   Skin:     General: Skin is warm and dry.   Neurological:      Mental Status: He is alert.         Result Review :                      PHQ-9 Total Score: 0             ASSESSMENT/ PLAN:    Statin Choice Calculator    Data Reviewed:   Assessment and Plan      Diagnoses and all orders for this visit:    1. Uncontrolled hypertension (Primary)  Comments:  b/p today 163/84.     2. Cough in adult  Comments:  Occasional productive cough.  Patient reports daily cough especially while at work.  39-pack-year history.  Orders:  -     montelukast (Singulair) 10 MG tablet; Take 1 tablet by mouth Every Night.  Dispense: 90 tablet; Refill: 3  -     albuterol sulfate  (90 Base) MCG/ACT inhaler; Inhale 2 puffs Every 4 (Four) Hours As Needed for Wheezing.  Dispense: 6.7 g; Refill: 10  -      CT Chest Low Dose Cancer Screening WO    3. Anxiety  Comments:  Claustrophobic   Orders:  -     hydrOXYzine (ATARAX) 25 MG tablet; Take 1 tablet by mouth 3 (Three) Times a Day As Needed for Itching or Anxiety.  Dispense: 30 tablet; Refill: 0    4. History of tobacco use  Comments:  39-pack-year history.  Low-dose lung CT ordered.  Orders:  -      CT Chest Low Dose Cancer Screening WO    5. Erectile dysfunction, unspecified erectile dysfunction type  Comments:  3-4 years ago.  Advised patient to discuss with his urologist.     6. Occupational exposure to chemicals  Comments:  Low-dose lung CT ordered  Orders:  -      CT  Chest Low Dose Cancer Screening WO         Uncontrolled hypertension [I10]             Wrapup Tab  Follow Up   Requested Prescriptions     Signed Prescriptions Disp Refills   • hydrOXYzine (ATARAX) 25 MG tablet 30 tablet 0     Sig: Take 1 tablet by mouth 3 (Three) Times a Day As Needed for Itching or Anxiety.   • montelukast (Singulair) 10 MG tablet 90 tablet 3     Sig: Take 1 tablet by mouth Every Night.   • albuterol sulfate  (90 Base) MCG/ACT inhaler 6.7 g 10     Sig: Inhale 2 puffs Every 4 (Four) Hours As Needed for Wheezing.     Medications Discontinued During This Encounter   Medication Reason   • prednisoLONE acetate (PRED FORTE) 1 % ophthalmic suspension *Therapy completed     Return in about 6 months (around 9/20/2023), or med refill. Next appointment with this provider is Visit date not found.      Patient was given instructions and counseling regarding his condition or for health maintenance advice. Please see specific information pulled into the AVS if appropriate.    Patient Instructions   Avoid salt in diet.        I wore protective equipment throughout this patient encounter to include mask. Hand hygiene was performed during entrance to exam room and following assessment of patient.     This document is intended for medical expert use only. Reading of this document by patients and/or patient's family without participating medical staff guidance may result in misinterpretation and unintended morbidity. Any interpretation of such data is the responsibility of the patient and/or family member responsible for the patient in concert with their primary or specialist providers, not to be left for sources of online searches such as Innoz, Diabeto or similar queries. Relying on these approaches to knowledge may result in misinterpretation, misguided goals of care and even death should patients or family members try recommendations outside of the realm of professional medical care.

## 2023-03-20 NOTE — PATIENT INSTRUCTIONS
Avoid salt in diet.   Continue current plan of care as discussed.   Take medication as ordered (if applicable).    Practice good sleep hygiene.  Eat a well balanced diet with fresh fruit and vegetables.    Stay hydrated, drink water daily.      Limit sweetened beverages, sodas, juices.    Bake, boil, broil or grill your food, avoid eating fried foods.   Regular exercise is important.  Incorporate weight or resistance into your routine.

## 2023-03-29 ENCOUNTER — TRANSCRIBE ORDERS (OUTPATIENT)
Dept: GENERAL RADIOLOGY | Facility: HOSPITAL | Age: 68
End: 2023-03-29
Payer: COMMERCIAL

## 2023-03-29 ENCOUNTER — HOSPITAL ENCOUNTER (OUTPATIENT)
Dept: GENERAL RADIOLOGY | Facility: HOSPITAL | Age: 68
Discharge: HOME | End: 2023-03-29
Admitting: UROLOGY
Payer: COMMERCIAL

## 2023-03-29 DIAGNOSIS — N20.0 CALCULUS, RENAL: Primary | ICD-10-CM

## 2023-03-29 DIAGNOSIS — N20.0 CALCULUS, RENAL: ICD-10-CM

## 2023-03-29 PROCEDURE — 74018 RADEX ABDOMEN 1 VIEW: CPT

## 2023-04-19 ENCOUNTER — HOSPITAL ENCOUNTER (OUTPATIENT)
Dept: CT IMAGING | Facility: HOSPITAL | Age: 68
Discharge: HOME | End: 2023-04-19
Payer: COMMERCIAL

## 2023-04-19 PROCEDURE — 71271 CT THORAX LUNG CANCER SCR C-: CPT

## 2023-08-12 DIAGNOSIS — E78.2 MIXED HYPERLIPIDEMIA: ICD-10-CM

## 2023-08-14 RX ORDER — AMLODIPINE BESYLATE 10 MG/1
TABLET ORAL
Qty: 90 TABLET | Refills: 3 | Status: SHIPPED | OUTPATIENT
Start: 2023-08-14

## 2023-08-14 RX ORDER — ATORVASTATIN CALCIUM 40 MG/1
TABLET, FILM COATED ORAL
Qty: 90 TABLET | Refills: 3 | Status: SHIPPED | OUTPATIENT
Start: 2023-08-14

## 2023-10-23 ENCOUNTER — HOSPITAL ENCOUNTER (OUTPATIENT)
Dept: GENERAL RADIOLOGY | Facility: HOSPITAL | Age: 68
Discharge: HOME | End: 2023-10-23
Admitting: UROLOGY
Payer: MEDICARE

## 2023-10-23 DIAGNOSIS — C61 PC (PROSTATE CANCER): ICD-10-CM

## 2023-10-23 DIAGNOSIS — N20.0 CALCULUS, RENAL: ICD-10-CM

## 2023-10-23 PROCEDURE — 74018 RADEX ABDOMEN 1 VIEW: CPT

## 2023-11-02 PROBLEM — R31.9 HEMATURIA: Status: ACTIVE | Noted: 2023-11-02

## 2023-11-02 PROBLEM — N52.9 ERECTILE DYSFUNCTION: Status: ACTIVE | Noted: 2023-11-02

## 2023-11-02 RX ORDER — OMEPRAZOLE 40 MG/1
CAPSULE, DELAYED RELEASE ORAL
Qty: 90 CAPSULE | Refills: 3 | Status: SHIPPED | OUTPATIENT
Start: 2023-11-02

## 2024-03-08 DIAGNOSIS — I10 ESSENTIAL HYPERTENSION: ICD-10-CM

## 2024-03-09 DIAGNOSIS — E78.2 MIXED HYPERLIPIDEMIA: ICD-10-CM

## 2024-03-09 DIAGNOSIS — I10 ESSENTIAL HYPERTENSION: Primary | ICD-10-CM

## 2024-03-09 DIAGNOSIS — Z13.29 SCREENING FOR THYROID DISORDER: ICD-10-CM

## 2024-03-09 DIAGNOSIS — Z12.5 SCREENING FOR PROSTATE CANCER: ICD-10-CM

## 2024-03-09 RX ORDER — LISINOPRIL AND HYDROCHLOROTHIAZIDE 20; 12.5 MG/1; MG/1
2 TABLET ORAL DAILY
Qty: 180 TABLET | Refills: 0 | Status: SHIPPED | OUTPATIENT
Start: 2024-03-09

## 2024-03-09 NOTE — TELEPHONE ENCOUNTER
Will refill but patient needs appt and routine blood work before next refill. I have placed orders for blood to be drawn at [facility]. Please have him go there at his convenience prior to the next appt. Thanks.

## 2024-03-11 NOTE — TELEPHONE ENCOUNTER
Lvm   patient has lab orders in system to be done over at [facility]   and he needs an office visit with Tere Perez to read

## 2024-03-22 ENCOUNTER — OFFICE VISIT (OUTPATIENT)
Dept: FAMILY MEDICINE CLINIC | Facility: CLINIC | Age: 69
End: 2024-03-22
Payer: MEDICARE

## 2024-03-22 VITALS
BODY MASS INDEX: 37.51 KG/M2 | RESPIRATION RATE: 12 BRPM | WEIGHT: 262 LBS | TEMPERATURE: 97.7 F | OXYGEN SATURATION: 98 % | DIASTOLIC BLOOD PRESSURE: 82 MMHG | HEIGHT: 70 IN | HEART RATE: 57 BPM | SYSTOLIC BLOOD PRESSURE: 132 MMHG

## 2024-03-22 DIAGNOSIS — E78.2 MIXED HYPERLIPIDEMIA: ICD-10-CM

## 2024-03-22 DIAGNOSIS — I10 BENIGN ESSENTIAL HTN: ICD-10-CM

## 2024-03-22 DIAGNOSIS — C61 PROSTATE CANCER: ICD-10-CM

## 2024-03-22 DIAGNOSIS — Z00.00 MEDICARE ANNUAL WELLNESS VISIT, SUBSEQUENT: Primary | ICD-10-CM

## 2024-03-22 DIAGNOSIS — R73.01 ELEVATED FASTING GLUCOSE: ICD-10-CM

## 2024-03-22 DIAGNOSIS — L81.9 CHANGE IN MULTIPLE PIGMENTED SKIN LESIONS: ICD-10-CM

## 2024-03-22 DIAGNOSIS — Z13.31 SCREENING FOR DEPRESSION: ICD-10-CM

## 2024-03-22 DIAGNOSIS — K21.9 GASTROESOPHAGEAL REFLUX DISEASE WITHOUT ESOPHAGITIS: ICD-10-CM

## 2024-03-22 DIAGNOSIS — Z91.09 ENVIRONMENTAL ALLERGIES: ICD-10-CM

## 2024-03-22 PROBLEM — H25.10 NUCLEAR SCLEROSIS: Status: ACTIVE | Noted: 2024-02-01

## 2024-03-22 PROBLEM — H43.819 POSTERIOR VITREOUS DETACHMENT: Status: ACTIVE | Noted: 2024-02-01

## 2024-03-22 PROBLEM — H25.811 COMBINED FORMS OF AGE-RELATED CATARACT OF RIGHT EYE: Status: ACTIVE | Noted: 2024-02-01

## 2024-03-22 LAB
EXPIRATION DATE: NORMAL
HBA1C MFR BLD: 5.6 % (ref 4.5–5.7)
Lab: NORMAL

## 2024-03-22 RX ORDER — MONTELUKAST SODIUM 10 MG/1
10 TABLET ORAL NIGHTLY
Qty: 90 TABLET | Refills: 3 | Status: SHIPPED | OUTPATIENT
Start: 2024-03-22

## 2024-03-22 RX ORDER — CETIRIZINE HYDROCHLORIDE 10 MG/1
10 TABLET ORAL DAILY
Qty: 90 TABLET | Refills: 3 | Status: CANCELLED | OUTPATIENT
Start: 2024-03-22

## 2024-03-22 RX ORDER — ALBUTEROL SULFATE 90 UG/1
2 AEROSOL, METERED RESPIRATORY (INHALATION) EVERY 4 HOURS PRN
Qty: 6.7 G | Refills: 2 | Status: SHIPPED | OUTPATIENT
Start: 2024-03-22

## 2024-03-22 RX ORDER — AMLODIPINE BESYLATE 10 MG/1
10 TABLET ORAL DAILY
Qty: 90 TABLET | Refills: 3 | Status: SHIPPED | OUTPATIENT
Start: 2024-03-22

## 2024-03-22 RX ORDER — LISINOPRIL AND HYDROCHLOROTHIAZIDE 20; 12.5 MG/1; MG/1
2 TABLET ORAL DAILY
Qty: 180 TABLET | Refills: 3 | Status: SHIPPED | OUTPATIENT
Start: 2024-03-22

## 2024-03-22 RX ORDER — OMEPRAZOLE 40 MG/1
40 CAPSULE, DELAYED RELEASE ORAL DAILY
Qty: 90 CAPSULE | Refills: 3 | Status: SHIPPED | OUTPATIENT
Start: 2024-03-22

## 2024-03-22 RX ORDER — ATORVASTATIN CALCIUM 40 MG/1
40 TABLET, FILM COATED ORAL DAILY
Qty: 90 TABLET | Refills: 3 | Status: SHIPPED | OUTPATIENT
Start: 2024-03-22

## 2024-03-22 NOTE — PROGRESS NOTES
The ABCs of the Annual Wellness Visit  Subsequent Medicare Wellness Visit    Subjective    Sergei Vance is a 69 y.o. male who presents for a Subsequent Medicare Wellness Visit.    The following portions of the patient's history were reviewed and   updated as appropriate: allergies, current medications, past family history, past medical history, past social history, past surgical history, and problem list.    Compared to one year ago, the patient feels his physical   health is the same.    Compared to one year ago, the patient feels his mental   health is the same.    Recent Hospitalizations:  He was not admitted to the hospital during the last year.       Current Medical Providers:  Patient Care Team:  Tere Clemente APRN as PCP - General (Nurse Practitioner)    Outpatient Medications Prior to Visit   Medication Sig Dispense Refill    acyclovir (ZOVIRAX) 400 MG tablet Take 1 tablet by mouth Daily.      Denta 5000 Plus 1.1 % cream USE TO BRUSH TWICE DAILY      albuterol sulfate  (90 Base) MCG/ACT inhaler Inhale 2 puffs Every 4 (Four) Hours As Needed for Wheezing. 6.7 g 10    amLODIPine (NORVASC) 10 MG tablet TAKE 1 TABLET BY MOUTH EVERY DAY 90 tablet 3    atorvastatin (LIPITOR) 40 MG tablet TAKE 1 TABLET BY MOUTH EVERY DAY 90 tablet 3    cetirizine (zyrTEC) 10 MG tablet TAKE 1 TABLET BY MOUTH EVERY DAY 30 tablet 5    lisinopril-hydrochlorothiazide (PRINZIDE,ZESTORETIC) 20-12.5 MG per tablet TAKE 2 TABLETS BY MOUTH DAILY 180 tablet 0    montelukast (Singulair) 10 MG tablet Take 1 tablet by mouth Every Night. 90 tablet 3    omeprazole (priLOSEC) 40 MG capsule TAKE 1 CAPSULE BY MOUTH EVERY DAY 90 capsule 3    hydrOXYzine (ATARAX) 25 MG tablet Take 1 tablet by mouth 3 (Three) Times a Day As Needed for Itching or Anxiety. (Patient not taking: Reported on 3/22/2024) 30 tablet 0     No facility-administered medications prior to visit.       No opioid medication identified on active medication list. I have  "reviewed chart for other potential  high risk medication/s and harmful drug interactions in the elderly.        Aspirin is not on active medication list.  Aspirin use is not indicated based on review of current medical condition/s. Risk of harm outweighs potential benefits.  .    Patient Active Problem List   Diagnosis    Benign prostatic hyperplasia    Gastroesophageal reflux disease    Mixed hyperlipidemia    Benign essential HTN    Left axis deviation    Renal insufficiency, mild    Residual hemorrhoidal skin tags    Prostate cancer    Adenomatous polyp    Allergic conjunctivitis    Morbidly obese    Dizziness    Palpitations    Subconjunctival hemorrhage, non-traumatic, left    Corneal epithelial defect    Herpes simplex keratitis    Tinnitus of both ears    Environmental allergies    Seasonal allergies    Erectile dysfunction    Hematuria    Combined forms of age-related cataract of right eye    Nuclear sclerosis    Posterior vitreous detachment     Advance Care Planning   Advance Care Planning     Advance Directive is on file.  ACP discussion was held with the patient during this visit. Patient has an advance directive in EMR which is still valid.      Objective    Vitals:    03/22/24 1015   BP: 132/82   BP Location: Left arm   Patient Position: Sitting   Cuff Size: Large Adult   Pulse: 57   Resp: 12   Temp: 97.7 °F (36.5 °C)   TempSrc: Infrared   SpO2: 98%   Weight: 119 kg (262 lb)   Height: 177.8 cm (70\")     Estimated body mass index is 37.59 kg/m² as calculated from the following:    Height as of this encounter: 177.8 cm (70\").    Weight as of this encounter: 119 kg (262 lb).    Class 2 Severe Obesity (BMI >=35 and <=39.9). Obesity-related health conditions include the following: hypertension. Obesity is unchanged. BMI is is above average; BMI management plan is completed. We discussed portion control and increasing exercise.      Does the patient have evidence of cognitive impairment? No    Lab Results "   Component Value Date    CHLPL 111 03/15/2024    TRIG 87 03/15/2024    HDL 33 (L) 03/15/2024    LDL 61 03/15/2024    VLDL 17 03/15/2024    HGBA1C 5.6 03/22/2024        HEALTH RISK ASSESSMENT    Smoking Status:  Social History     Tobacco Use   Smoking Status Former    Current packs/day: 0.00    Average packs/day: 1 pack/day for 39.0 years (39.0 ttl pk-yrs)    Types: Cigarettes    Start date: 1970    Quit date: 2009    Years since quitting: 15.2   Smokeless Tobacco Former    Types: Chew    Quit date: 2016     Alcohol Consumption:  Social History     Substance and Sexual Activity   Alcohol Use Yes    Alcohol/week: 2.0 standard drinks of alcohol    Types: 2 Cans of beer per week     Fall Risk Screen:    SHARMILA Fall Risk Assessment was completed, and patient is at LOW risk for falls.Assessment completed on:3/22/2024    Depression Screening:      3/22/2024    10:12 AM   PHQ-2/PHQ-9 Depression Screening   Little Interest or Pleasure in Doing Things 0-->not at all   Feeling Down, Depressed or Hopeless 0-->not at all   PHQ-9: Brief Depression Severity Measure Score 0       Health Habits and Functional and Cognitive Screening:      3/22/2024    10:12 AM   Functional & Cognitive Status   Do you have difficulty preparing food and eating? No   Do you have difficulty bathing yourself, getting dressed or grooming yourself? No   Do you have difficulty using the toilet? No   Do you have difficulty moving around from place to place? No   Do you have trouble with steps or getting out of a bed or a chair? No   Current Diet Well Balanced Diet   Dental Exam Up to date   Eye Exam Up to date   Exercise (times per week) 3 times per week   Current Exercises Include Treadmill;Hiking   Do you need help using the phone?  No   Are you deaf or do you have serious difficulty hearing?  Yes   Do you need help to go to places out of walking distance? No   Do you need help shopping? No   Do you need help preparing meals?  No   Do you need help with  housework?  No   Do you need help with laundry? No   Do you need help taking your medications? No   Do you need help managing money? No   Do you ever drive or ride in a car without wearing a seat belt? No   Have you felt unusual stress, anger or loneliness in the last month? No   Who do you live with? Spouse   If you need help, do you have trouble finding someone available to you? No   Do you have difficulty concentrating, remembering or making decisions? No       Age-appropriate Screening Schedule:  Refer to the list below for future screening recommendations based on patient's age, sex and/or medical conditions. Orders for these recommended tests are listed in the plan section. The patient has been provided with a written plan.    Health Maintenance   Topic Date Due    RSV Vaccine - Adults (1 - 1-dose 60+ series) Never done    ZOSTER VACCINE (2 of 2) 10/06/2022    COVID-19 Vaccine (3 - 2023-24 season) 09/01/2023    INFLUENZA VACCINE  03/31/2024 (Originally 8/1/2023)    COLORECTAL CANCER SCREENING  03/09/2025    LIPID PANEL  03/15/2025    ANNUAL WELLNESS VISIT  03/22/2025    BMI FOLLOWUP  03/22/2025    TDAP/TD VACCINES (2 - Td or Tdap) 09/19/2032    HEPATITIS C SCREENING  Completed    Pneumococcal Vaccine 65+  Completed    AAA SCREEN (ONE-TIME)  Completed    LUNG CANCER SCREENING  Discontinued                  CMS Preventative Services Quick Reference  Risk Factors Identified During Encounter  None Identified  The above risks/problems have been discussed with the patient.  Pertinent information has been shared with the patient in the After Visit Summary.  An After Visit Summary and PPPS were made available to the patient.    Follow Up:   Next Medicare Wellness visit to be scheduled in 1 year.       Additional E&M Note during same encounter follows:  Patient has multiple medical problems which are significant and separately identifiable that require additional work above and beyond the Medicare Wellness Visit.   "    Chief Complaint  Medicare Wellness-subsequent    Subjective        Hypertension  This is a chronic problem. The current episode started more than 1 year ago. The problem is unchanged. The problem is controlled. Associated symptoms include peripheral edema. Pertinent negatives include no blurred vision, chest pain, headaches, orthopnea, palpitations or shortness of breath. Risk factors for coronary artery disease include dyslipidemia, family history, male gender, obesity and smoking/tobacco exposure. Current antihypertension treatment includes beta blockers, ACE inhibitors and diuretics. There are no compliance problems.      Sergei Vance is also being seen today for HTN    Review of Systems   Constitutional: Negative.    HENT: Negative.     Eyes: Negative.  Negative for blurred vision.   Respiratory: Negative.  Negative for shortness of breath.    Cardiovascular:  Negative for chest pain, palpitations and orthopnea.   Endocrine: Negative.    Genitourinary: Negative.    Musculoskeletal: Negative.    Skin: Negative.    Allergic/Immunologic: Negative.    Neurological: Negative.    Hematological: Negative.    Psychiatric/Behavioral: Negative.         Objective   Vital Signs:  /82 (BP Location: Left arm, Patient Position: Sitting, Cuff Size: Large Adult)   Pulse 57   Temp 97.7 °F (36.5 °C) (Infrared)   Resp 12   Ht 177.8 cm (70\")   Wt 119 kg (262 lb)   SpO2 98%   BMI 37.59 kg/m²     Physical Exam  Vitals and nursing note reviewed.   Constitutional:       General: He is not in acute distress.     Appearance: Normal appearance. He is well-groomed and overweight.   HENT:      Right Ear: Tympanic membrane, ear canal and external ear normal. There is no impacted cerumen.      Left Ear: Tympanic membrane, ear canal and external ear normal. There is no impacted cerumen.      Nose: Nose normal.      Mouth/Throat:      Mouth: Mucous membranes are moist.   Eyes:      General: Lids are normal. Vision grossly " intact.   Neck:      Thyroid: No thyroid mass, thyromegaly or thyroid tenderness.      Vascular: No carotid bruit.   Cardiovascular:      Rate and Rhythm: Normal rate and regular rhythm.      Pulses: Normal pulses.      Heart sounds: Normal heart sounds.   Pulmonary:      Effort: Pulmonary effort is normal.      Breath sounds: Normal breath sounds.   Abdominal:      General: Bowel sounds are normal.      Palpations: Abdomen is soft. Abdomen is not rigid.      Tenderness: There is no abdominal tenderness. There is no right CVA tenderness or left CVA tenderness.   Musculoskeletal:      Cervical back: Normal range of motion and neck supple.      Right lower leg: No edema.      Left lower leg: No edema.   Lymphadenopathy:      Cervical: No cervical adenopathy.   Skin:     General: Skin is warm and dry.      Capillary Refill: Capillary refill takes less than 2 seconds.      Findings: Rash is macular and papular.      Comments: Multiple hyperpigmented macules and papules scattered throughout body.   H/O Excess sun exposure.      Neurological:      General: No focal deficit present.      Mental Status: He is alert and oriented to person, place, and time.   Psychiatric:         Attention and Perception: Attention and perception normal.         Mood and Affect: Mood normal.         Speech: Speech normal.         Behavior: Behavior normal. Behavior is cooperative.         Thought Content: Thought content normal.         Cognition and Memory: Cognition and memory normal.         Judgment: Judgment normal.          Physical Exam   Chest       Back       Upper extremities                            Assessment and Plan   Diagnoses and all orders for this visit:    1. Medicare annual wellness visit, subsequent (Primary)    2. Benign essential HTN  -     amLODIPine (NORVASC) 10 MG tablet; Take 1 tablet by mouth Daily.  Dispense: 90 tablet; Refill: 3  -     lisinopril-hydrochlorothiazide (PRINZIDE,ZESTORETIC) 20-12.5 MG per tablet;  Take 2 tablets by mouth Daily.  Dispense: 180 tablet; Refill: 3    3. Mixed hyperlipidemia  -     atorvastatin (LIPITOR) 40 MG tablet; Take 1 tablet by mouth Daily.  Dispense: 90 tablet; Refill: 3    4. Gastroesophageal reflux disease without esophagitis  -     omeprazole (priLOSEC) 40 MG capsule; Take 1 capsule by mouth Daily.  Dispense: 90 capsule; Refill: 3    5. Environmental allergies  -     albuterol sulfate  (90 Base) MCG/ACT inhaler; Inhale 2 puffs Every 4 (Four) Hours As Needed for Wheezing.  Dispense: 6.7 g; Refill: 2  -     montelukast (Singulair) 10 MG tablet; Take 1 tablet by mouth Every Night.  Dispense: 90 tablet; Refill: 3    6. Prostate cancer  Comments:  Current with urology speciaist.    7. Elevated fasting glucose  -     POC Glycosylated Hemoglobin (Hb A1C)    8. Change in multiple pigmented skin lesions  -     Ambulatory Referral to Dermatology    9. Screening for depression  Comments:  PHQ2 = 0             Follow Up   Return in about 6 months (around 9/22/2024) for Recheck blood work .  Patient was given instructions and counseling regarding his condition or for health maintenance advice. Please see specific information pulled into the AVS if appropriate.

## 2024-05-06 RX ORDER — FLUTICASONE PROPIONATE AND SALMETEROL 100; 50 UG/1; UG/1
1 POWDER RESPIRATORY (INHALATION)
Qty: 60 EACH | Refills: 11 | Status: SHIPPED | OUTPATIENT
Start: 2024-05-06

## 2024-05-06 RX ORDER — FLUTICASONE PROPIONATE AND SALMETEROL 100; 50 UG/1; UG/1
1 POWDER RESPIRATORY (INHALATION)
Qty: 30 EACH | Refills: 12 | Status: SHIPPED | OUTPATIENT
Start: 2024-05-06 | End: 2024-05-06

## 2024-10-21 ENCOUNTER — OFFICE VISIT (OUTPATIENT)
Dept: FAMILY MEDICINE CLINIC | Facility: CLINIC | Age: 69
End: 2024-10-21
Payer: MEDICARE

## 2024-10-21 VITALS
BODY MASS INDEX: 37.62 KG/M2 | HEART RATE: 52 BPM | OXYGEN SATURATION: 97 % | RESPIRATION RATE: 18 BRPM | DIASTOLIC BLOOD PRESSURE: 84 MMHG | HEIGHT: 70 IN | WEIGHT: 262.8 LBS | SYSTOLIC BLOOD PRESSURE: 136 MMHG | TEMPERATURE: 97.3 F

## 2024-10-21 DIAGNOSIS — R00.1 BRADYCARDIA: ICD-10-CM

## 2024-10-21 DIAGNOSIS — Z12.11 SCREENING FOR COLON CANCER: ICD-10-CM

## 2024-10-21 DIAGNOSIS — R73.01 ELEVATED FASTING GLUCOSE: ICD-10-CM

## 2024-10-21 DIAGNOSIS — D36.9 ADENOMATOUS POLYP: ICD-10-CM

## 2024-10-21 DIAGNOSIS — E78.2 MIXED HYPERLIPIDEMIA: ICD-10-CM

## 2024-10-21 DIAGNOSIS — I10 BENIGN ESSENTIAL HTN: Primary | ICD-10-CM

## 2024-10-21 DIAGNOSIS — F41.9 ANXIETY: ICD-10-CM

## 2024-10-21 DIAGNOSIS — Z71.85 IMMUNIZATION COUNSELING: ICD-10-CM

## 2024-10-21 LAB
BILIRUB BLD-MCNC: NEGATIVE MG/DL
CLARITY, POC: CLEAR
COLOR UR: YELLOW
GLUCOSE UR STRIP-MCNC: NEGATIVE MG/DL
KETONES UR QL: NEGATIVE
LEUKOCYTE EST, POC: NEGATIVE
NITRITE UR-MCNC: NEGATIVE MG/ML
PH UR: 7 [PH] (ref 5–8)
PROT UR STRIP-MCNC: NEGATIVE MG/DL
RBC # UR STRIP: NEGATIVE /UL
SP GR UR: 1.03 (ref 1–1.03)
UROBILINOGEN UR QL: NORMAL

## 2024-10-21 PROCEDURE — 3079F DIAST BP 80-89 MM HG: CPT

## 2024-10-21 PROCEDURE — 99214 OFFICE O/P EST MOD 30 MIN: CPT

## 2024-10-21 PROCEDURE — 1126F AMNT PAIN NOTED NONE PRSNT: CPT

## 2024-10-21 PROCEDURE — 3075F SYST BP GE 130 - 139MM HG: CPT

## 2024-10-21 PROCEDURE — 81003 URINALYSIS AUTO W/O SCOPE: CPT

## 2024-10-21 RX ORDER — AMLODIPINE BESYLATE 5 MG/1
5 TABLET ORAL DAILY
Qty: 90 TABLET | Refills: 3 | Status: SHIPPED | OUTPATIENT
Start: 2024-10-21

## 2024-10-21 RX ORDER — LISINOPRIL AND HYDROCHLOROTHIAZIDE 20; 25 MG/1; MG/1
1 TABLET ORAL DAILY
Qty: 90 TABLET | Refills: 3 | Status: SHIPPED | OUTPATIENT
Start: 2024-10-21

## 2024-10-21 NOTE — PROGRESS NOTES
Office Note     Name: Sergei Vance    : 1955     MRN: [id number]     Chief Complaint  Follow-up (On labs from March)    Subjective     History of Present Illness:  Sergei Vance is a 69 y.o. male who presents today for hypertension management, hyperlipidemia, prediabetes.       Hyperlipidemia  This is a recurrent problem. The current episode started more than 1 month ago. The problem is controlled. There are no known factors aggravating his hyperlipidemia. Pertinent negatives include no chest pain or shortness of breath. He is currently on no antihyperlipidemic treatment. The current treatment provides no improvement of lipids. There are no compliance problems.  There are no known risk factors for coronary artery disease.   Hypertension  This is a recurrent problem. The current episode started more than 1 month ago. The problem is unchanged. The problem is controlled. Pertinent negatives include no chest pain, palpitations or shortness of breath. There are no associated agents to hypertension. There are no known risk factors for coronary artery disease. There are no compliance problems.      History of Present Illness  The patient is a 69-year-old male who presents for a follow-up visit. His last office visit was in 2024.    He reports that his blood pressure readings at home are typically lower than those recorded in the clinic. He takes amlodipine 10 mg daily and lisinopril HCTZ daily. He also mentions occasional swelling in his feet.    He is currently on atorvastatin 40 mg nightly for cholesterol management.    He is taking omeprazole for acid reflux, which he reports is effective.    He has a persistent craving for sweets. He is due for a colonoscopy in 2025 and is under the care of Dr. Burgess for prostate issues, with PSA tests conducted every 6 months. He is scheduled for cataract surgery in about a month.    He reports experiencing more anxiety than usual, which he attributes to  claustrophobia that began two years ago during a cruise. He finds small hotel rooms challenging but is comfortable in larger suites. He enjoys hiking and other outdoor activities. He believes his claustrophobia is improving, although he still struggles with long flights. He feels his anxiety and claustrophobia sometimes interfere with his life, but he is working on managing them. He does not feel the need for medication for his anxiety. He has had one or two panic attacks in the past, but none recently.    He reports an incident before he retired, he worked a double shift one day and got dehydrated. He went to the emergency room and received fluids for dehydration. He was advised of a low heart rate at 45.    FAMILY HISTORY  His mother had arrhythmia. His father  when he was just a few days old and reportedly had a brain tumor at one time. His maternal grandmother had colon cancer. His maternal grandfather had prostate cancer.    No Known Allergies      Current Outpatient Medications:   •  acyclovir (ZOVIRAX) 400 MG tablet, Take 1 tablet by mouth Daily., Disp: , Rfl:   •  albuterol sulfate  (90 Base) MCG/ACT inhaler, Inhale 2 puffs Every 4 (Four) Hours As Needed for Wheezing., Disp: 6.7 g, Rfl: 2  •  amLODIPine (NORVASC) 5 MG tablet, Take 1 tablet by mouth Daily., Disp: 90 tablet, Rfl: 3  •  atorvastatin (LIPITOR) 40 MG tablet, Take 1 tablet by mouth Daily., Disp: 90 tablet, Rfl: 3  •  Denta 5000 Plus 1.1 % cream, USE TO BRUSH TWICE DAILY, Disp: , Rfl:   •  Fluticasone-Salmeterol (ADVAIR/WIXELA) 100-50 MCG/ACT DISKUS, Inhale 1 puff 2 (Two) Times a Day., Disp: 60 each, Rfl: 11  •  omeprazole (priLOSEC) 40 MG capsule, Take 1 capsule by mouth Daily., Disp: 90 capsule, Rfl: 3  •  lisinopril-hydrochlorothiazide (PRINZIDE,ZESTORETIC) 20-25 MG per tablet, Take 1 tablet by mouth Daily., Disp: 90 tablet, Rfl: 3    Review of Systems   Respiratory:  Negative for cough, shortness of breath and wheezing.   "  Cardiovascular:  Negative for chest pain, palpitations and leg swelling.   Psychiatric/Behavioral:  The patient is not nervous/anxious.    All other systems reviewed and are negative.      Social History     Socioeconomic History   • Marital status:    Tobacco Use   • Smoking status: Former     Current packs/day: 0.00     Average packs/day: 1 pack/day for 39.0 years (39.0 ttl pk-yrs)     Types: Cigarettes     Start date: 1970     Quit date: 2009     Years since quitting: 15.8   • Smokeless tobacco: Former     Types: Chew     Quit date: 2016   Vaping Use   • Vaping status: Never Used   Substance and Sexual Activity   • Alcohol use: Yes     Alcohol/week: 2.0 standard drinks of alcohol     Types: 2 Cans of beer per week   • Drug use: No   • Sexual activity: Defer       Family History   Problem Relation Age of Onset   • Arrhythmia Mother    • No Known Problems Father    • Colon cancer Maternal Grandmother    • Prostate cancer Maternal Grandfather            3/22/2024    10:12 AM   PHQ-2/PHQ-9 Depression Screening   Retired Little Interest or Pleasure in Doing Things 0-->not at all   Retired Feeling Down, Depressed or Hopeless 0-->not at all   Retired PHQ-9: Brief Depression Severity Measure Score 0       Fall Risk Screen:  SHARMILA Fall Risk Assessment was completed, and patient is at LOW risk for falls.Assessment completed on:3/22/2024      Objective     /84 (BP Location: Right arm, Patient Position: Sitting, Cuff Size: Adult)   Pulse 52   Temp 97.3 °F (36.3 °C) (Temporal)   Resp 18   Ht 177.8 cm (70\")   Wt 119 kg (262 lb 12.8 oz)   SpO2 97%   BMI 37.71 kg/m²     BP Readings from Last 2 Encounters:   10/21/24 136/84   03/22/24 132/82       Wt Readings from Last 2 Encounters:   10/21/24 119 kg (262 lb 12.8 oz)   03/22/24 119 kg (262 lb)                Physical Exam  Vitals and nursing note reviewed.   Constitutional:       General: He is not in acute distress.     Appearance: Normal appearance. He is " well-groomed. He is not ill-appearing.   HENT:      Head: Normocephalic and atraumatic.   Eyes:      General: Lids are normal. Vision grossly intact.   Neck:      Vascular: No carotid bruit.   Cardiovascular:      Rate and Rhythm: Regular rhythm. Bradycardia present.      Heart sounds: Normal heart sounds.   Pulmonary:      Effort: Pulmonary effort is normal.      Breath sounds: Normal breath sounds and air entry.   Musculoskeletal:      Right lower leg: No edema.      Left lower leg: No edema.   Skin:     General: Skin is warm and dry.   Neurological:      Mental Status: He is alert and oriented to person, place, and time. Mental status is at baseline.   Psychiatric:         Mood and Affect: Mood normal.         Behavior: Behavior normal. Behavior is cooperative.         Thought Content: Thought content normal.       Derm Physical Exam  Physical Exam  Vital Signs  Blood pressure measures 136/84. Heart rate registers at 52.    Result Review :       Results  Laboratory Studies  Glucose level was 138. A1c was 5.6.    Assessment and Plan     Plan      Diagnoses and all orders for this visit:    1. Benign essential HTN (Primary)  Assessment & Plan:      Orders:  -     Comprehensive Metabolic Panel  -     POC Urinalysis Dipstick, Multipro  -     CBC & Differential  -     amLODIPine (NORVASC) 5 MG tablet; Take 1 tablet by mouth Daily.  Dispense: 90 tablet; Refill: 3  -     lisinopril-hydrochlorothiazide (PRINZIDE,ZESTORETIC) 20-25 MG per tablet; Take 1 tablet by mouth Daily.  Dispense: 90 tablet; Refill: 3    2. Mixed hyperlipidemia  Assessment & Plan:       Orders:  -     Lipid Panel With LDL / HDL Ratio    3. Elevated fasting glucose  -     Hemoglobin A1c    4. Immunization counseling    5. Adenomatous polyp  -     Ambulatory Referral For Screening Colonoscopy    6. Bradycardia    7. Screening for colon cancer  -     Ambulatory Referral For Screening Colonoscopy    8. Anxiety       Assessment & Plan  Benign essential  HTN  His blood pressure is well-managed at 136/84, albeit slightly elevated compared to home readings. However, his heart rate is notably low at 52 bpm. The dosage of amlodipine will be reduced from 10 mg to 5 mg, while the hydrochlorothiazide component of his lisinopril HCTZ will be increased to 25 mg. These changes aim to control his blood pressure without further lowering his heart rate. The prescriptions will be sent to [facility].  Return in 2 weeks for follow up to monitor his blood pressure and heart rate.  Mixed hyperlipidemia   He is advised to continue taking atorvastatin 40 mg every night. A lipid panel will be ordered to assess his cholesterol levels.  Elevated fasting glucose  A1C ordered  Immunization counseling  Declines immunizations at this time.  Adenomatous polyp  Colonoscopy ordered.  Bradycardia  Change medication to decrease amlodipine dosage.   Screening for colon cancer  Colonoscopy ordered.  Anxiety  He reports increased anxiety and claustrophobia, especially in confined spaces. He has previously taken hydroxyzine as needed for anxiety but did not tolerate it well. Alternative medications such as BuSpar (buspirone) and propranolol were discussed. He does not feel the need for daily medication at this time but was advised to consider these options if his anxiety worsens.    Orders Placed This Encounter   Procedures   • Comprehensive Metabolic Panel   • Lipid Panel With LDL / HDL Ratio   • Hemoglobin A1c   • Ambulatory Referral For Screening Colonoscopy   • POC Urinalysis Dipstick, Multipro   • CBC & Differential     New Medications Ordered This Visit   Medications   • amLODIPine (NORVASC) 5 MG tablet     Sig: Take 1 tablet by mouth Daily.     Dispense:  90 tablet     Refill:  3   • lisinopril-hydrochlorothiazide (PRINZIDE,ZESTORETIC) 20-25 MG per tablet     Sig: Take 1 tablet by mouth Daily.     Dispense:  90 tablet     Refill:  3       Assessment & Plan  1. Hypertension.  His blood pressure is  well-managed at 136/84, albeit slightly elevated compared to home readings. However, his heart rate is notably low at 52 bpm. The dosage of amlodipine will be reduced from 10 mg to 5 mg, while the hydrochlorothiazide component of his lisinopril HCTZ will be increased to 25 mg. These changes aim to control his blood pressure without further lowering his heart rate. The prescriptions will be sent to [facility].  Return in 2 weeks for follow up to monitor his blood pressure and heart rate.    2. Hypercholesterolemia.  He is advised to continue taking atorvastatin 40 mg every night. A lipid panel will be ordered to assess his cholesterol levels.    3. Acid Reflux.  He is advised to continue his current regimen of omeprazole.    4. Prediabetes.  His glucose level was elevated at 138 in March 2024, placing him on the borderline of prediabetes. An A1c test will be ordered to rule out diabetes.    5. Anxiety.  He reports increased anxiety and claustrophobia, especially in confined spaces. He has previously taken hydroxyzine as needed for anxiety but did not tolerate it well. Alternative medications such as BuSpar (buspirone) and propranolol were discussed. He does not feel the need for daily medication at this time but was advised to consider these options if his anxiety worsens.    6. Health Maintenance.  His last colonoscopy in 2019 was normal. A referral for a colonoscopy will be made to Dr. Park. Additional tests including CBC, CMP, and urinalysis will be ordered. His most recent PSA test in March 2024 showed a low level, and he will continue regular monitoring with Dr. Burgess.    Follow-up  Return in 2 weeks for follow up.       Follow Up   Wrapup Tab  Return in about 2 weeks (around 11/4/2024) for Recheck b/p.     Patient was given instructions and counseling regarding his condition or for health maintenance advice. Please see specific information pulled into the AVS if appropriate.  Hand hygiene was performed during  entrance to exam room and following assessment of patient. This document is intended for medical expert use only.       JO ANN Gallagher, FNP-C  [address]  [facility]  [address] [place] [address] DR ROBERTO JACKSON [address] [place] [id number] [phone]    Patient or patient representative verbalized consent for the use of Ambient Listening during the visit with  JO ANN Gallagher for chart documentation. 10/21/2024  11:30 EDT

## 2024-10-21 NOTE — ASSESSMENT & PLAN NOTE
He is advised to continue taking atorvastatin 40 mg every night. A lipid panel will be ordered to assess his cholesterol levels.

## 2024-10-21 NOTE — ASSESSMENT & PLAN NOTE
His blood pressure is well-managed at 136/84, albeit slightly elevated compared to home readings. However, his heart rate is notably low at 52 bpm. The dosage of amlodipine will be reduced from 10 mg to 5 mg, while the hydrochlorothiazide component of his lisinopril HCTZ will be increased to 25 mg. These changes aim to control his blood pressure without further lowering his heart rate. The prescriptions will be sent to [facility].  Return in 2 weeks for follow up to monitor his blood pressure and heart rate.

## 2024-10-21 NOTE — ASSESSMENT & PLAN NOTE
He reports increased anxiety and claustrophobia, especially in confined spaces. He has previously taken hydroxyzine as needed for anxiety but did not tolerate it well. Alternative medications such as BuSpar (buspirone) and propranolol were discussed. He does not feel the need for daily medication at this time but was advised to consider these options if his anxiety worsens.

## 2024-11-07 ENCOUNTER — CLINICAL SUPPORT (OUTPATIENT)
Dept: FAMILY MEDICINE CLINIC | Facility: CLINIC | Age: 69
End: 2024-11-07
Payer: MEDICARE

## 2024-11-07 VITALS — SYSTOLIC BLOOD PRESSURE: 124 MMHG | DIASTOLIC BLOOD PRESSURE: 82 MMHG

## 2024-11-07 DIAGNOSIS — I10 BENIGN ESSENTIAL HTN: Primary | ICD-10-CM

## 2024-11-20 NOTE — PROGRESS NOTES
Office Note     Name: Sergei Vance    : 1955     MRN: [id number]     Chief Complaint  Hypertension (Adjustments was made to medication at last visit)    Subjective     History of Present Illness:  Sergei Vance is a 69 y.o. male who presents today for follow up for hypertension. Made adjustments to medication at last visit. Patient stated that he has not been keeping record of it since the change in dose of medication.     Hypertension  This is a recurrent problem. The current episode started 1 to 4 weeks ago. The problem is controlled. Pertinent negatives include no chest pain, palpitations or shortness of breath. Current antihypertension treatment includes ACE inhibitors and beta blockers.       History of Present Illness  The patient is a 69-year-old male who presents for a hypertension follow-up.    He reports no significant changes since his last visit. He does not monitor his blood pressure at home. He is not experiencing chest pain, palpitations, or leg swelling.    His heart rate was slightly low during the last office visit, leading to a reduction in amlodipine to 5 mg and an increase in hydrochlorothiazide to 25 mg on his lisinopril HCTZ regimen. His current blood pressure is 135/71, and his pulse is 57. He does not check his blood pressure at home but plans to use his wife's cuff. He reports no chest pain, palpitations, or leg swelling.    He tested positive for COVID-19 last week and has been experiencing congestion since then. He continues to have a cough from a recent illness. He is not currently taking any medication for the congestion. He reports feeling better overall. He does not have colored nasal discharge or sinus pressure. He experienced a headache, which he attributes to the COVID-19 infection. He does not have chills, fatigue, or fever.    He experiences shortness of breath and wheezing during physical activities such as hiking.    No Known Allergies      Current Outpatient  Medications:     acyclovir (ZOVIRAX) 400 MG tablet, Take 1 tablet by mouth Daily., Disp: , Rfl:     albuterol sulfate  (90 Base) MCG/ACT inhaler, Inhale 2 puffs Every 4 (Four) Hours As Needed for Wheezing., Disp: 6.7 g, Rfl: 2    amLODIPine (NORVASC) 5 MG tablet, Take 1 tablet by mouth Daily., Disp: 90 tablet, Rfl: 3    atorvastatin (LIPITOR) 40 MG tablet, Take 1 tablet by mouth Daily., Disp: 90 tablet, Rfl: 3    Denta 5000 Plus 1.1 % cream, USE TO BRUSH TWICE DAILY, Disp: , Rfl:     Fluticasone-Salmeterol (ADVAIR/WIXELA) 100-50 MCG/ACT DISKUS, Inhale 1 puff 2 (Two) Times a Day., Disp: 60 each, Rfl: 11    lisinopril-hydrochlorothiazide (PRINZIDE,ZESTORETIC) 20-25 MG per tablet, Take 1 tablet by mouth Daily., Disp: 90 tablet, Rfl: 3    omeprazole (priLOSEC) 40 MG capsule, Take 1 capsule by mouth Daily., Disp: 90 capsule, Rfl: 3    Review of Systems   Constitutional:  Negative for chills, fatigue and fever.   HENT:  Positive for congestion.    Respiratory:  Positive for cough. Negative for shortness of breath and wheezing.    Cardiovascular:  Negative for chest pain, palpitations and leg swelling.   All other systems reviewed and are negative.      Social History     Socioeconomic History    Marital status:    Tobacco Use    Smoking status: Former     Current packs/day: 0.00     Average packs/day: 1 pack/day for 39.0 years (39.0 ttl pk-yrs)     Types: Cigarettes     Start date: 1970     Quit date: 2009     Years since quitting: 15.8    Smokeless tobacco: Former     Types: Chew     Quit date: 2016   Vaping Use    Vaping status: Never Used   Substance and Sexual Activity    Alcohol use: Yes     Alcohol/week: 2.0 standard drinks of alcohol     Types: 2 Cans of beer per week    Drug use: No    Sexual activity: Defer       Family History   Problem Relation Age of Onset    Arrhythmia Mother     No Known Problems Father     Colon cancer Maternal Grandmother     Prostate cancer Maternal Grandfather             "3/22/2024    10:12 AM   PHQ-2/PHQ-9 Depression Screening   Retired Little Interest or Pleasure in Doing Things 0-->not at all   Retired Feeling Down, Depressed or Hopeless 0-->not at all   Retired PHQ-9: Brief Depression Severity Measure Score 0       Fall Risk Screen:  SHARMILA Fall Risk Assessment was completed, and patient is at LOW risk for falls.Assessment completed on:3/22/2024      Objective     /71 (BP Location: Right arm, Patient Position: Sitting, Cuff Size: Large Adult)   Pulse 57   Temp 96.3 °F (35.7 °C) (Temporal)   Resp 18   Ht 177.8 cm (70\")   Wt 116 kg (255 lb)   SpO2 97%   BMI 36.59 kg/m²     BP Readings from Last 2 Encounters:   11/21/24 135/71   11/07/24 124/82       Wt Readings from Last 2 Encounters:   11/21/24 116 kg (255 lb)   10/21/24 119 kg (262 lb 12.8 oz)                Physical Exam  Vitals and nursing note reviewed.   Constitutional:       General: He is not in acute distress.     Appearance: Normal appearance. He is well-groomed. He is not ill-appearing.   HENT:      Head: Normocephalic and atraumatic.      Nose: Congestion present.      Comments: Nasally congested sounding voice  Eyes:      General: Lids are normal. Vision grossly intact.   Neck:      Vascular: No carotid bruit.   Cardiovascular:      Rate and Rhythm: Normal rate and regular rhythm.      Heart sounds: Normal heart sounds.   Pulmonary:      Effort: Pulmonary effort is normal.      Breath sounds: Normal breath sounds and air entry.   Musculoskeletal:      Right lower leg: No edema.      Left lower leg: No edema.   Skin:     General: Skin is warm and dry.   Neurological:      Mental Status: He is alert and oriented to person, place, and time. Mental status is at baseline.   Psychiatric:         Mood and Affect: Mood normal.         Behavior: Behavior normal. Behavior is cooperative.       Physical Exam   EENT     Nose comments: Nasally congested sounding voice    Physical Exam  Vital Signs  Blood pressure " measures 135/71. Pulse rate is 57.    Result Review :       Results      Assessment and Plan     Plan      Assessment & Plan  Benign essential HTN           History of COVID-19           Assessment & Plan  1. Hypertension.  Hypertension well-controlled with current regimen.  Continue regimen with home blood pressure monitoring.  Return at next scheduled follow-up.    2. Recent COVID-19 infection.  He had COVID-19 last week and still has some congestion and nasal stuffiness. He reports no colored nasal discharge, sinus pressure, or ear pressure. He experienced a headache, which he attributes to COVID-19. He feels he is getting better and does not take much medication. If he does not improve, he should inform the office to consider antibiotic therapy.         Follow Up   Wrapup Tab  No follow-ups on file.     Patient was given instructions and counseling regarding his condition or for health maintenance advice. Please see specific information pulled into the AVS if appropriate.  Hand hygiene was performed during entrance to exam room and following assessment of patient. This document is intended for medical expert use only.       JO ANN Gallagher, FNP-C  [address]  [facility]  [address] [place] [address] [address]  [facility] [id number] [phone]    Patient or patient representative verbalized consent for the use of Ambient Listening during the visit with  JO ANN Gallagher for chart documentation. 11/21/2024  12:13 EST

## 2024-11-21 ENCOUNTER — OFFICE VISIT (OUTPATIENT)
Dept: FAMILY MEDICINE CLINIC | Facility: CLINIC | Age: 69
End: 2024-11-21
Payer: MEDICARE

## 2024-11-21 VITALS
RESPIRATION RATE: 18 BRPM | HEIGHT: 70 IN | WEIGHT: 255 LBS | OXYGEN SATURATION: 97 % | SYSTOLIC BLOOD PRESSURE: 135 MMHG | BODY MASS INDEX: 36.51 KG/M2 | TEMPERATURE: 96.3 F | DIASTOLIC BLOOD PRESSURE: 71 MMHG | HEART RATE: 57 BPM

## 2024-11-21 DIAGNOSIS — I10 BENIGN ESSENTIAL HTN: Primary | ICD-10-CM

## 2024-11-21 DIAGNOSIS — Z86.16 HISTORY OF COVID-19: ICD-10-CM

## 2024-11-21 PROCEDURE — 3075F SYST BP GE 130 - 139MM HG: CPT

## 2024-11-21 PROCEDURE — 99213 OFFICE O/P EST LOW 20 MIN: CPT

## 2024-11-21 PROCEDURE — 3078F DIAST BP <80 MM HG: CPT

## 2024-11-21 PROCEDURE — 1126F AMNT PAIN NOTED NONE PRSNT: CPT

## 2024-11-21 NOTE — ASSESSMENT & PLAN NOTE
Updated annual wellness visit checklist.  Immunizations up-to-date.  Screening up-to-date.  Recommend yearly dental and eye exams. Also discussed monitoring of blood pressure and lipids.   yes

## 2024-12-09 ENCOUNTER — HOSPITAL ENCOUNTER (OUTPATIENT)
Dept: GENERAL RADIOLOGY | Facility: HOSPITAL | Age: 69
Discharge: HOME | End: 2024-12-09
Admitting: UROLOGY
Payer: MEDICARE

## 2024-12-09 DIAGNOSIS — N20.0 CALCULUS, RENAL: ICD-10-CM

## 2024-12-09 DIAGNOSIS — C61 CANCER OF PROSTATE: ICD-10-CM

## 2024-12-09 PROCEDURE — 74018 RADEX ABDOMEN 1 VIEW: CPT

## 2025-01-14 ENCOUNTER — TELEPHONE (OUTPATIENT)
Dept: FAMILY MEDICINE CLINIC | Facility: CLINIC | Age: 70
End: 2025-01-14

## 2025-01-14 ENCOUNTER — OFFICE VISIT (OUTPATIENT)
Dept: FAMILY MEDICINE CLINIC | Facility: CLINIC | Age: 70
End: 2025-01-14
Payer: MEDICARE

## 2025-01-14 VITALS
BODY MASS INDEX: 36.51 KG/M2 | SYSTOLIC BLOOD PRESSURE: 132 MMHG | DIASTOLIC BLOOD PRESSURE: 76 MMHG | WEIGHT: 255 LBS | HEIGHT: 70 IN | OXYGEN SATURATION: 98 % | RESPIRATION RATE: 8 BRPM | HEART RATE: 75 BPM | TEMPERATURE: 97.3 F

## 2025-01-14 DIAGNOSIS — R05.8 PRODUCTIVE COUGH: ICD-10-CM

## 2025-01-14 DIAGNOSIS — R09.81 NASAL CONGESTION: ICD-10-CM

## 2025-01-14 DIAGNOSIS — J01.00 ACUTE MAXILLARY SINUSITIS, RECURRENCE NOT SPECIFIED: Primary | ICD-10-CM

## 2025-01-14 LAB
EXPIRATION DATE: NORMAL
FLUAV AG UPPER RESP QL IA.RAPID: NOT DETECTED
FLUBV AG UPPER RESP QL IA.RAPID: NOT DETECTED
INTERNAL CONTROL: NORMAL
Lab: NORMAL
SARS-COV-2 AG UPPER RESP QL IA.RAPID: NOT DETECTED

## 2025-01-14 PROCEDURE — 3078F DIAST BP <80 MM HG: CPT

## 2025-01-14 PROCEDURE — 1126F AMNT PAIN NOTED NONE PRSNT: CPT

## 2025-01-14 PROCEDURE — 3075F SYST BP GE 130 - 139MM HG: CPT

## 2025-01-14 PROCEDURE — 99213 OFFICE O/P EST LOW 20 MIN: CPT

## 2025-01-14 PROCEDURE — 87428 SARSCOV & INF VIR A&B AG IA: CPT

## 2025-01-14 RX ORDER — AZITHROMYCIN 250 MG/1
TABLET, FILM COATED ORAL
Qty: 6 TABLET | Refills: 0 | Status: SHIPPED | OUTPATIENT
Start: 2025-01-14

## 2025-01-14 RX ORDER — METHYLPREDNISOLONE 4 MG/1
TABLET ORAL
Qty: 21 TABLET | Refills: 0 | Status: SHIPPED | OUTPATIENT
Start: 2025-01-14

## 2025-01-14 RX ORDER — CHLORCYCLIZINE HYDROCHLORIDE AND PSEUDOEPHEDRINE HYDROCHLORIDE 25; 60 MG/1; MG/1
1 TABLET ORAL 2 TIMES DAILY PRN
Qty: 42 TABLET | Refills: 0 | Status: SHIPPED | OUTPATIENT
Start: 2025-01-14

## 2025-01-14 NOTE — TELEPHONE ENCOUNTER
"    Caller: Sergei Vance \"AYDEN\"    Relationship: Self    Best call back number: [phone]    What medication are you requesting: WHATEVER DOCTOR RECOMMENDS    What are your current symptoms: HEAD/CHEST CONGESTION, COUGH, BODY ACHES, FEVER, HEADACHE    How long have you been experiencing symptoms: A LITTLE OVER A WEEK    Have you had these symptoms before:    [x] Yes  [] No    Have you been treated for these symptoms before:   [x] Yes  [] No    If a prescription is needed, what is your preferred pharmacy and phone number: [id number] - [place], [place] - [address] [facility] [address] - [phone] [facility] [phone] FX     Additional notes:      "

## 2025-01-14 NOTE — PROGRESS NOTES
Office Note     Name: Sergei Vance    : 1955     MRN: [id number]     Chief Complaint  Cough, Generalized Body Aches, and URI    Subjective     Sergei Vance presents to [facility] for an acute complaint of cough, headache, body ache, congestion  for 1 week. Mainly in the morning is the worst, and sometime when sitting around in the evening will start feeling bad. Patient has tried over the counter medication with no relief. He will start feeling better and then back to feeling bad.     Cough  This is a new problem. The current episode started in the past 7 days. The problem has been comes and goes. The problem occurs every few minutes. The cough is Dry. Associated symptoms include headaches and rhinorrhea. Pertinent negatives include no fever or sore throat. Nothing aggravates the symptoms. He has tried OTC cough suppressant for the symptoms. The treatment provided no relief.   URI   This is a new problem. The current episode started in the past 7 days. The problem has been unchanged. There has been no fever. Associated symptoms include congestion, coughing, headaches, rhinorrhea and sneezing. Pertinent negatives include no nausea, sore throat, swollen glands or vomiting. He has tried nothing for the symptoms. The treatment provided no relief.     History of Present Illness  The patient is a 69-year-old male who presents for evaluation of feeling ill.    He had COVID-19 about a month ago.    He has been using an albuterol rescue inhaler for occasional shortness of breath.    His blood pressure has been well-controlled at home, although he admits to not monitoring it as frequently as recommended.    SOCIAL HISTORY  He is currently retired.    MEDICATIONS  Current: Albuterol rescue inhaler, Zyrtec         Current Outpatient Medications:     acyclovir (ZOVIRAX) 400 MG tablet, Take 1 tablet by mouth Daily., Disp: , Rfl:     albuterol sulfate  (90 Base) MCG/ACT  inhaler, Inhale 2 puffs Every 4 (Four) Hours As Needed for Wheezing., Disp: 6.7 g, Rfl: 2    amLODIPine (NORVASC) 5 MG tablet, Take 1 tablet by mouth Daily., Disp: 90 tablet, Rfl: 3    atorvastatin (LIPITOR) 40 MG tablet, Take 1 tablet by mouth Daily., Disp: 90 tablet, Rfl: 3    Denta 5000 Plus 1.1 % cream, USE TO BRUSH TWICE DAILY, Disp: , Rfl:     Fluticasone-Salmeterol (ADVAIR/WIXELA) 100-50 MCG/ACT DISKUS, Inhale 1 puff 2 (Two) Times a Day., Disp: 60 each, Rfl: 11    lisinopril-hydrochlorothiazide (PRINZIDE,ZESTORETIC) 20-25 MG per tablet, Take 1 tablet by mouth Daily., Disp: 90 tablet, Rfl: 3    omeprazole (priLOSEC) 40 MG capsule, Take 1 capsule by mouth Daily., Disp: 90 capsule, Rfl: 3    azithromycin (ZITHROMAX) 250 MG tablet, Take 2 tablets the first day, then 1 tablet daily for 4 days., Disp: 6 tablet, Rfl: 0    Chlorcyclizine-Pseudoephed (Stahist AD) 25-60 MG tablet, Take 1 tablet by mouth 2 (Two) Times a Day As Needed (sinus pressure / congestion)., Disp: 42 tablet, Rfl: 0    methylPREDNISolone (MEDROL) 4 MG dose pack, Take as directed on package instructions., Disp: 21 tablet, Rfl: 0    No Known Allergies          1/14/2025     1:34 PM   PHQ-2/PHQ-9 Depression Screening   Little interest or pleasure in doing things Not at all   Feeling down, depressed, or hopeless Not at all   How difficult have these problems made it for you to do your work, take care of things at home, or get along with other people? Not difficult at all       Review of Systems   Constitutional:  Negative for activity change and fever.        Body aches   HENT:  Positive for congestion, rhinorrhea, sinus pressure and sneezing. Negative for sore throat, swollen glands, tinnitus, trouble swallowing and voice change.    Respiratory:  Positive for cough.    Gastrointestinal:  Negative for nausea and vomiting.   Neurological:  Positive for headache.   All other systems reviewed and are negative.      Objective     /76 (BP Location:  "Left arm, Patient Position: Sitting, Cuff Size: Large Adult)   Pulse 75   Temp 97.3 °F (36.3 °C) (Temporal)   Resp 8   Ht 177.9 cm (70.02\")   Wt 116 kg (255 lb)   SpO2 98%   BMI 36.57 kg/m²          Physical Exam  Vitals and nursing note reviewed.   Constitutional:       General: He is not in acute distress.     Appearance: Normal appearance. He is well-groomed. He is ill-appearing. He is not toxic-appearing or diaphoretic.   HENT:      Head: Normocephalic and atraumatic.      Right Ear: External ear normal. A middle ear effusion is present.      Left Ear: Hearing, tympanic membrane, ear canal and external ear normal.      Nose: Congestion and rhinorrhea present.      Right Sinus: Maxillary sinus tenderness present.      Left Sinus: Maxillary sinus tenderness present.      Mouth/Throat:      Lips: Pink.      Mouth: Mucous membranes are moist.      Pharynx: Uvula midline. Postnasal drip present.   Eyes:      General: Lids are normal. Vision grossly intact.   Neck:      Vascular: No carotid bruit.   Cardiovascular:      Rate and Rhythm: Normal rate and regular rhythm.      Heart sounds: Normal heart sounds.   Pulmonary:      Effort: Pulmonary effort is normal.      Breath sounds: Normal breath sounds and air entry. No stridor, decreased air movement or transmitted upper airway sounds.   Skin:     General: Skin is warm and dry.   Neurological:      Mental Status: He is alert and oriented to person, place, and time. Mental status is at baseline.   Psychiatric:         Mood and Affect: Mood normal.         Behavior: Behavior normal. Behavior is cooperative.       Derm Physical Exam  Physical Exam       Result Review   Results  Laboratory Studies  COVID-19 test is negative. Influenza test is negative.     Assessment and Plan       Assessment & Plan  Acute maxillary sinusitis, recurrence not specified         Productive cough    Orders:    POCT SARS-CoV-2 + Flu Antigen [facility]    Nasal congestion           Assessment & " Plan  1. Upper respiratory infection.  COVID-19 and influenza tests are negative. Symptoms include sinus pressure, sneezing, coughing, and body aches. A prescription for a Z-Griffin and a steroid pack has been issued to [facility] pharmacy. He is advised to begin stahist and discontinue Zyrtec during this period. Over-the-counter medications such as Tylenol or ibuprofen can be used for body aches. He is also advised to gargle with warm salt water if needed and to replace his toothbrush after 24 hours of antibiotic use.    2. Bronchitis.  He has a history of bronchitis and is currently experiencing symptoms including coughing and occasional shortness of breath. He is advised to continue using his albuterol rescue inhaler as needed.    3. Hypertension.  His blood pressure is well-controlled at home.      Procedures     {Instructions Charge Capture  Follow-up Communications     Wrapup Tab  No follow-ups on file.     There are no Patient Instructions on file for this visit.   Patient was given instructions and counseling regarding his condition or for health maintenance advice. Please see specific information pulled into the AVS if appropriate.  Hand hygiene was performed during entrance to exam room and following assessment of patient. This document is intended for medical expert use only.     EMR Dragon/Transcription disclaimer:   Much of this encounter note is an electronic transcription/translation of spoken language to printed text. The electronic translation of spoken language may permit erroneous, or at times, nonsensical words or phrases to be inadvertently transcribed.      JO ANN Gallagher, FNP-C  [address]  [facility]  [address] [place] [address] [address]  [facility] [id number] [phone]    Patient or patient representative verbalized consent for the use of Ambient Listening during the visit with  JO ANN Gallagher for chart documentation. 1/14/2025  14:21 EST

## 2025-01-20 PROBLEM — K63.5 COLON POLYPS: Status: ACTIVE | Noted: 2025-01-20

## 2025-02-22 DIAGNOSIS — K21.9 GASTROESOPHAGEAL REFLUX DISEASE WITHOUT ESOPHAGITIS: ICD-10-CM

## 2025-02-24 RX ORDER — OMEPRAZOLE 40 MG/1
40 CAPSULE, DELAYED RELEASE ORAL DAILY
Qty: 90 CAPSULE | Refills: 3 | Status: SHIPPED | OUTPATIENT
Start: 2025-02-24

## 2025-03-24 NOTE — PROGRESS NOTES
Subjective   The ABCs of the Annual Wellness Visit  Medicare Wellness Visit      Sergei Vance is a 70 y.o. patient who presents for a Medicare Wellness Visit.    The following portions of the patient's history were reviewed and   updated as appropriate: allergies, current medications, past family history, past medical history, past social history, past surgical history, and problem list.    Compared to one year ago, the patient's physical   health is the same.  Compared to one year ago, the patient's mental   health is the same.    Recent Hospitalizations:  He was not admitted to the hospital during the last year.     Current Medical Providers:  Patient Care Team:  Tere Clemente APRN as PCP - General (Nurse Practitioner)  Dr. Orellana, Ophthalmology  [facility], Dentistry  Dr. Bradley, Urology  Dr. Guardado, Cardiology  Dermatology       Outpatient Medications Prior to Visit   Medication Sig Dispense Refill    acyclovir (ZOVIRAX) 400 MG tablet Take 1 tablet by mouth Daily.      Denta 5000 Plus 1.1 % cream USE TO BRUSH TWICE DAILY      albuterol sulfate  (90 Base) MCG/ACT inhaler Inhale 2 puffs Every 4 (Four) Hours As Needed for Wheezing. 6.7 g 2    amLODIPine (NORVASC) 5 MG tablet Take 1 tablet by mouth Daily. 90 tablet 3    atorvastatin (LIPITOR) 40 MG tablet Take 1 tablet by mouth Daily. 90 tablet 3    lisinopril-hydrochlorothiazide (PRINZIDE,ZESTORETIC) 20-25 MG per tablet Take 1 tablet by mouth Daily. 90 tablet 3    omeprazole (priLOSEC) 40 MG capsule Take 1 capsule by mouth Daily. 90 capsule 3    azithromycin (ZITHROMAX) 250 MG tablet Take 2 tablets the first day, then 1 tablet daily for 4 days. (Patient not taking: Reported on 3/25/2025) 6 tablet 0    Chlorcyclizine-Pseudoephed (Stahist AD) 25-60 MG tablet Take 1 tablet by mouth 2 (Two) Times a Day As Needed (sinus pressure / congestion). (Patient not taking: Reported on 3/25/2025) 42 tablet 0    Fluticasone-Salmeterol (ADVAIR/WIXELA) 100-50  MCG/ACT DISKUS Inhale 1 puff 2 (Two) Times a Day. (Patient not taking: Reported on 3/25/2025) 60 each 11    methylPREDNISolone (MEDROL) 4 MG dose pack Take as directed on package instructions. (Patient not taking: Reported on 3/25/2025) 21 tablet 0     No facility-administered medications prior to visit.     No opioid medication identified on active medication list. I have reviewed chart for other potential  high risk medication/s and harmful drug interactions in the elderly.      Aspirin is not on active medication list.  Aspirin use is indicated based on review of current medical condition/s. Pros and cons of this therapy have been discussed with this patient. Benefits of this medication outweigh potential harm.  Patient has been instructed to start taking this medication..    Patient Active Problem List   Diagnosis    Benign prostatic hyperplasia    Gastroesophageal reflux disease    Mixed hyperlipidemia    Benign essential HTN    Left axis deviation    Renal insufficiency, mild    Residual hemorrhoidal skin tags    Prostate cancer    Adenomatous polyp    Allergic conjunctivitis    Morbidly obese    Dizziness    Palpitations    Subconjunctival hemorrhage, non-traumatic, left    Corneal epithelial defect    Herpes simplex keratitis    Tinnitus of both ears    Environmental allergies    Seasonal allergies    Erectile dysfunction    Hematuria    Combined forms of age-related cataract of right eye    Nuclear sclerosis    Posterior vitreous detachment    Anxiety    Bradycardia    Colon polyps     Advance Care Planning Advance Directive is not on file.  ACP discussion was held with the patient during this visit. Patient does not have an advance directive, information provided.            Objective   Vitals:    03/25/25 1024   BP: 122/78   BP Location: Right arm   Patient Position: Sitting   Cuff Size: Large Adult   Pulse: 54   Resp: 18   Temp: 97.3 °F (36.3 °C)   TempSrc: Temporal   SpO2: 98%   Weight: 121 kg (267 lb  "12.8 oz)   Height: 177.9 cm (70.04\")   PainSc: 0-No pain       Estimated body mass index is 38.38 kg/m² as calculated from the following:    Height as of this encounter: 177.9 cm (70.04\").    Weight as of this encounter: 121 kg (267 lb 12.8 oz).    Class 2 Severe Obesity (BMI >=35 and <=39.9). Obesity-related health conditions include the following: hypertension, dyslipidemias, GERD, and osteoarthritis. Obesity is unchanged. BMI is is above average; BMI management plan is completed. We discussed low calorie, low carb based diet program, portion control, increasing exercise, management of depression/anxiety/stress to control compensatory eating, decreasing alcohol consumption, and an manuel-based approach such as [employer] or Lose It.           Does the patient have evidence of cognitive impairment? No                                                                                                Health  Risk Assessment    Smoking Status:  Social History     Tobacco Use   Smoking Status Former    Current packs/day: 0.00    Average packs/day: 1 pack/day for 39.0 years (39.0 ttl pk-yrs)    Types: Cigarettes    Start date:     Quit date:     Years since quittin.2   Smokeless Tobacco Former    Types: Chew    Quit date:      Alcohol Consumption:  Social History     Substance and Sexual Activity   Alcohol Use Yes    Alcohol/week: 2.0 standard drinks of alcohol    Types: 2 Cans of beer per week       Fall Risk Screen  STEADI Fall Risk Assessment was completed, and patient is at LOW risk for falls.Assessment completed on:3/25/2025    Depression Screening   Little interest or pleasure in doing things? Not at all   Feeling down, depressed, or hopeless? Not at all   PHQ-2 Total Score 0      Health Habits and Functional and Cognitive Screening:      3/25/2025    10:21 AM   Functional & Cognitive Status   Do you have difficulty preparing food and eating? No   Do you have difficulty bathing yourself, getting " dressed or grooming yourself? No   Do you have difficulty using the toilet? No   Do you have difficulty moving around from place to place? No   Do you have trouble with steps or getting out of a bed or a chair? No   Current Diet Other   Dental Exam Up to date   Eye Exam Up to date   Exercise (times per week) 0 times per week   Current Exercises Include No Regular Exercise   Do you need help using the phone?  No   Are you deaf or do you have serious difficulty hearing?  No   Do you need help to go to places out of walking distance? No   Do you need help shopping? No   Do you need help preparing meals?  No   Do you need help with housework?  No   Do you need help with laundry? No   Do you need help taking your medications? No   Do you need help managing money? No   Do you ever drive or ride in a car without wearing a seat belt? No   Have you felt unusual stress, anger or loneliness in the last month? No   Who do you live with? Spouse   If you need help, do you have trouble finding someone available to you? No   Have you been bothered in the last four weeks by sexual problems? No   Do you have difficulty concentrating, remembering or making decisions? No           Age-appropriate Screening Schedule:  Refer to the list below for future screening recommendations based on patient's age, sex and/or medical conditions. Orders for these recommended tests are listed in the plan section. The patient has been provided with a written plan.    Health Maintenance List  Health Maintenance   Topic Date Due    LIPID PANEL  03/15/2025    COVID-19 Vaccine (3 - 2024-25 season) 04/08/2025 (Originally 9/1/2024)    INFLUENZA VACCINE  04/08/2025 (Originally 7/1/2024)    ZOSTER VACCINE (2 of 2) 09/21/2025 (Originally 10/6/2022)    ANNUAL WELLNESS VISIT  03/25/2026    COLORECTAL CANCER SCREENING  03/09/2029    TDAP/TD VACCINES (2 - Td or Tdap) 09/19/2032    HEPATITIS C SCREENING  Completed    Pneumococcal Vaccine 50+  Completed    AAA SCREEN  ONCE  Completed    LUNG CANCER SCREENING  Discontinued                                                                                                                                                CMS Preventative Services Quick Reference  Risk Factors Identified During Encounter  Alcohol Misuse: Patient encouraged to limit alcohol use to no more than 1 standard alcoholic beverage per day. (12 ounce beer, 6 ounce wine, one shot liquor)  Depression/Dysphoria:  Considered medication.     The above risks/problems have been discussed with the patient.  Pertinent information has been shared with the patient in the After Visit Summary.  An After Visit Summary and PPPS were made available to the patient.    Follow Up:   Next Medicare Wellness visit to be scheduled in 1 year.         Additional E&M Note during same encounter follows:  Patient has additional, significant, and separately identifiable condition(s)/problem(s) that require work above and beyond the Medicare Wellness Visit     Chief Complaint  Medicare Wellness-subsequent    Subjective    HPI  AYDEN is also being seen today for additional medical problem/s including hypertension, hyperlipidemia, gastroesophageal reflux disease, anxiety and depression.       The patient is a 70-year-old male who presents for his Medicare wellness visit.    He reports a stable physical health status, although he has been experiencing weight gain, which he attributes to the winter season. He maintains an active lifestyle, engaging in outdoor activities such as hiking. He does not have an advanced care plan or a living will. He does not have sleep apnea, fatty liver, or diabetes. He has a history of acid reflux, hypertension, and high cholesterol. He has cataracts but no glaucoma. He does not feel isolated, lacks transportation, or feels lonely. He does not take excessive medication or engage in high-risk sexual behavior. He does not smoke tobacco. He sees the dentist regularly. He  does not experience chronic pain or falls. He has never gotten into trouble for alcohol. He occasionally experiences shortness of breath but manages it well. He does not experience nausea, vomiting, diarrhea, constipation, blood in stool or urine, easy bruising or bleeding, eating problems, or urinary issues. He has an enlarged prostate. He has a mole that he is concerned about and would like a referral to dermatology. He is fasting today. He has a couple of cats as pets. He used to take baby aspirin daily but has since discontinued it. He takes Zyrtec for allergies. He continues to take acyclovir for a viral infection in his eyes, prescribed by Dr. Reyna. He is scheduled for surgery to remove a kidney stone. He sees Dr. Waddell at cardiology but has not seen him since he retired.    He experiences anxiety and depression, particularly in the mornings, which he manages by staying active and avoiding excessive alcohol consumption. He has considered medication for his anxiety and depression and has discussed this with several doctors. He has previously taken hydroxyzine for anxiety but discontinued it due to side effects.    He has arthritis in his spine, neck, and hands, with the neck being the most affected area.    SOCIAL HISTORY  He drinks alcohol but not excessively. He smokes marijuana occasionally.        MEDICATIONS  Current: albuterol rescue inhaler, amlodipine, atorvastatin 40 mg, lisinopril HCTZ 20/25 mg, omeprazole, Zyrtec (over the counter), acyclovir  Discontinued: Advair, hydroxyzine  Review of Systems   Constitutional: Negative.    HENT: Negative.     Respiratory:  Negative for cough, shortness of breath and wheezing.    Cardiovascular:  Negative for chest pain, palpitations and leg swelling.   Gastrointestinal:         Gerd   Genitourinary: Negative.         Enlarged prostate   Musculoskeletal:  Positive for arthralgias (spine, neck, hands).   Skin:         Multiple moles   Allergic/Immunologic: Positive  "for environmental allergies.   Neurological: Negative.    Hematological: Negative.    Psychiatric/Behavioral:  Positive for dysphoric mood. Negative for self-injury, sleep disturbance and suicidal ideas. The patient is nervous/anxious.           Objective   Vital Signs:  /78 (BP Location: Right arm, Patient Position: Sitting, Cuff Size: Large Adult)   Pulse 54   Temp 97.3 °F (36.3 °C) (Temporal)   Resp 18   Ht 177.9 cm (70.04\")   Wt 121 kg (267 lb 12.8 oz)   SpO2 98%   BMI 38.38 kg/m²   Physical Exam  Vitals and nursing note reviewed.   Constitutional:       General: He is not in acute distress.     Appearance: Normal appearance. He is well-groomed and overweight.   HENT:      Right Ear: Tympanic membrane, ear canal and external ear normal. There is no impacted cerumen.      Left Ear: Tympanic membrane, ear canal and external ear normal. There is no impacted cerumen.      Nose: Nose normal.      Mouth/Throat:      Mouth: Mucous membranes are moist.   Eyes:      General: Lids are normal. Vision grossly intact.   Neck:      Thyroid: No thyroid mass, thyromegaly or thyroid tenderness.      Vascular: No carotid bruit.   Cardiovascular:      Rate and Rhythm: Normal rate and regular rhythm.      Pulses: Normal pulses.      Heart sounds: Normal heart sounds.   Pulmonary:      Effort: Pulmonary effort is normal.      Breath sounds: Normal breath sounds.   Abdominal:      General: Bowel sounds are normal.      Palpations: Abdomen is soft. Abdomen is not rigid.      Tenderness: There is no abdominal tenderness. There is no right CVA tenderness or left CVA tenderness.   Musculoskeletal:      Cervical back: Normal range of motion and neck supple.      Right lower leg: No edema.      Left lower leg: No edema.   Lymphadenopathy:      Cervical: No cervical adenopathy.   Skin:     General: Skin is warm and dry.      Capillary Refill: Capillary refill takes less than 2 seconds.      Findings: Rash is macular and " papular.      Comments: Multiple hyperpigmented macules and papules scattered throughout body.   H/O Excess sun exposure.      Neurological:      General: No focal deficit present.      Mental Status: He is alert and oriented to person, place, and time.   Psychiatric:         Attention and Perception: Attention and perception normal.         Mood and Affect: Mood normal.         Speech: Speech normal.         Behavior: Behavior normal. Behavior is cooperative.         Thought Content: Thought content normal.         Cognition and Memory: Cognition and memory normal.         Judgment: Judgment normal.           Oral exam was performed.  Lungs were auscultated.  Heart was examined.  Abdomen was examined.            Results             Assessment and Plan Additional age appropriate preventative wellness advice topics were discussed during today's preventative wellness exam(some topics already addressed during AWV portion of the note above):    Physical Activity: Advised cardiovascular activity 150 minutes per week as tolerated. (example brisk walk for 30 minutes, 5 days a week).   Nutrition: Discussed nutrition plan with patient. Information shared in after visit summary. Goal is for a well balanced diet to enhance overall health.   Healthy Weight: Discussed current and goal BMI with patient. Steps to attain this goal discussed. Information shared in after visit summary.   Gun Safety Awareness Discussion: Information Shared in after visit summary.   Tobacco Misuse Discussion: Information shared in after visit summary.   Alcohol Misuse Discussion: Information shared in after visit summary.   Drug Misuse Discussion:  Avoidance of Drug Use recommendation given.  Information shared in after visit summary.   Motor Vehicle Safety Discussion:  Wearing Seatbelt While in Motor Vehicle recommendation. Adhering to posted speed limit recommendation.   Injury Prevention Discussion:  Information shared in after visit  summary.        Assessment & Plan  Medicare annual wellness visit, subsequent  Discussed injury prevention, diet and exercise, safe sexual practices, and screening for common diseases. Encouraged use of sunscreen and seatbelts. Avoidance of tobacco encouraged. Limitation or avoidance of alcohol encouraged.      Anticipatory guidance given and routine screenings recommended with recommendations of yearly dental and eye exams., monitoring of blood pressure, lipids, and screening for colon and lung cancer risks.          Mixed hyperlipidemia   Lipid levels will be checked.  Continue diet, lifestyle and medication.       Orders:    atorvastatin (LIPITOR) 40 MG tablet; Take 1 tablet by mouth Daily.    Lipid Panel With LDL / HDL Ratio    Benign essential HTN  Hypertension controlled with diet, lifestyle and medication.    Will check labs and refill medication as needed.       Orders:    amLODIPine (NORVASC) 5 MG tablet; Take 1 tablet by mouth Daily.    lisinopril-hydrochlorothiazide (PRINZIDE,ZESTORETIC) 20-25 MG per tablet; Take 1 tablet by mouth Daily.    CBC & Differential    Comprehensive Metabolic Panel    Anxiety and depression  Anxiety depression is uncontrolled.  Wellbutrin will be be started and  thyroid levels will be checked.    Orders:    buPROPion XL (WELLBUTRIN XL) 150 MG 24 hr tablet; Take 1 tablet by mouth Daily.    TSH Rfx On Abnormal To Free T4    Gastroesophageal reflux disease without esophagitis  Avoid eating or drinking approximately three hours before bed.  Avoid tobacco use, spicy foods, alcohol, caffeinated beverages or other foods that irritate GERD.  Sleep elevated 10-15 degrees.  Take medication as directed (if ordered).    Orders:    omeprazole (priLOSEC) 40 MG capsule; Take 1 capsule by mouth Daily.    Environmental allergies    Orders:    albuterol sulfate  (90 Base) MCG/ACT inhaler; Inhale 2 puffs Every 4 (Four) Hours As Needed for Wheezing.    Change in mole    Orders:    Ambulatory  Referral to Dermatology    Screening for prostate cancer    Orders:    PSA Screen    Screening for thyroid disorder    Orders:    TSH Rfx On Abnormal To Free T4           1. Medicare wellness visit.  He is advised to adhere to a Mediterranean diet, which is low in carbohydrates and includes healthy proteins such as chicken and fish, while limiting red meats and pasta. The diet should also be rich in fruits, vegetables, and nuts. He is encouraged to reduce his alcohol intake to manage his anxiety and depression effectively. The use of the [employer] manuel is recommended for tracking his weight, dietary intake, and physical activity. He is also advised to limit his consumption of nuts due to their high caloric content. A shingles vaccine is recommended. Routine blood work will be conducted. Prescriptions for amlodipine, atorvastatin 40 mg, lisinopril HCTZ 20/25 mg, and omeprazole have been refilled.    2. Anxiety and depression.  He is advised to abstain from alcohol consumption. A prescription for Wellbutrin has been provided to manage his anxiety and depression. If he feels the need to talk to someone, a referral to psychiatry can be arranged.    3. Arthritis.  He reports having arthritis in his spine, neck, and hands, with the neck being the most affected area.    Follow-up  The patient will follow up in 1 month to monitor his anxiety and depression.         Follow Up   Return in about 4 weeks (around 4/22/2025) for Recheck - anxiety and depression - 4 weeks  / med well one year .  Patient was given instructions and counseling regarding his condition or for health maintenance advice. Please see specific information pulled into the AVS if appropriate.  Patient or patient representative verbalized consent for the use of Ambient Listening during the visit with  JO ANN Gallagher for chart documentation. 3/25/2025  19:47 EDT

## 2025-03-25 ENCOUNTER — OFFICE VISIT (OUTPATIENT)
Dept: FAMILY MEDICINE CLINIC | Facility: CLINIC | Age: 70
End: 2025-03-25
Payer: MEDICARE

## 2025-03-25 VITALS
WEIGHT: 267.8 LBS | BODY MASS INDEX: 38.34 KG/M2 | OXYGEN SATURATION: 98 % | RESPIRATION RATE: 18 BRPM | TEMPERATURE: 97.3 F | HEART RATE: 54 BPM | SYSTOLIC BLOOD PRESSURE: 122 MMHG | DIASTOLIC BLOOD PRESSURE: 78 MMHG | HEIGHT: 70 IN

## 2025-03-25 DIAGNOSIS — Z12.5 SCREENING FOR PROSTATE CANCER: ICD-10-CM

## 2025-03-25 DIAGNOSIS — I10 BENIGN ESSENTIAL HTN: ICD-10-CM

## 2025-03-25 DIAGNOSIS — E78.2 MIXED HYPERLIPIDEMIA: ICD-10-CM

## 2025-03-25 DIAGNOSIS — Z13.29 SCREENING FOR THYROID DISORDER: ICD-10-CM

## 2025-03-25 DIAGNOSIS — D22.9 CHANGE IN MOLE: ICD-10-CM

## 2025-03-25 DIAGNOSIS — K21.9 GASTROESOPHAGEAL REFLUX DISEASE WITHOUT ESOPHAGITIS: ICD-10-CM

## 2025-03-25 DIAGNOSIS — Z91.09 ENVIRONMENTAL ALLERGIES: ICD-10-CM

## 2025-03-25 DIAGNOSIS — Z00.00 MEDICARE ANNUAL WELLNESS VISIT, SUBSEQUENT: Primary | ICD-10-CM

## 2025-03-25 DIAGNOSIS — F32.A ANXIETY AND DEPRESSION: ICD-10-CM

## 2025-03-25 DIAGNOSIS — F41.9 ANXIETY AND DEPRESSION: ICD-10-CM

## 2025-03-25 RX ORDER — AMLODIPINE BESYLATE 5 MG/1
5 TABLET ORAL DAILY
Qty: 90 TABLET | Refills: 3 | Status: SHIPPED | OUTPATIENT
Start: 2025-03-25

## 2025-03-25 RX ORDER — OMEPRAZOLE 40 MG/1
40 CAPSULE, DELAYED RELEASE ORAL DAILY
Qty: 90 CAPSULE | Refills: 3 | Status: SHIPPED | OUTPATIENT
Start: 2025-03-25

## 2025-03-25 RX ORDER — ATORVASTATIN CALCIUM 40 MG/1
40 TABLET, FILM COATED ORAL DAILY
Qty: 90 TABLET | Refills: 3 | Status: SHIPPED | OUTPATIENT
Start: 2025-03-25

## 2025-03-25 RX ORDER — LISINOPRIL AND HYDROCHLOROTHIAZIDE 20; 25 MG/1; MG/1
1 TABLET ORAL DAILY
Qty: 90 TABLET | Refills: 3 | Status: SHIPPED | OUTPATIENT
Start: 2025-03-25

## 2025-03-25 RX ORDER — BUPROPION HYDROCHLORIDE 150 MG/1
150 TABLET ORAL DAILY
Qty: 30 TABLET | Refills: 12 | Status: SHIPPED | OUTPATIENT
Start: 2025-03-25

## 2025-03-25 RX ORDER — ALBUTEROL SULFATE 90 UG/1
2 INHALANT RESPIRATORY (INHALATION) EVERY 4 HOURS PRN
Qty: 6.7 G | Refills: 2 | Status: SHIPPED | OUTPATIENT
Start: 2025-03-25

## 2025-03-25 NOTE — ASSESSMENT & PLAN NOTE
Avoid eating or drinking approximately three hours before bed.  Avoid tobacco use, spicy foods, alcohol, caffeinated beverages or other foods that irritate GERD.  Sleep elevated 10-15 degrees.  Take medication as directed (if ordered).    Orders:    omeprazole (priLOSEC) 40 MG capsule; Take 1 capsule by mouth Daily.

## 2025-03-25 NOTE — ASSESSMENT & PLAN NOTE
Lipid levels will be checked.  Continue diet, lifestyle and medication.       Orders:    atorvastatin (LIPITOR) 40 MG tablet; Take 1 tablet by mouth Daily.    Lipid Panel With LDL / HDL Ratio     The patient has mild cornea guttata, or early Fuchs' endothelial dystrophy. Specular microscopy documented the cell count and morphology. The possibility that the condition could progress was explained. At this early stage observation and serial specular microscopy are indicated.

## 2025-03-25 NOTE — ASSESSMENT & PLAN NOTE
Hypertension controlled with diet, lifestyle and medication.    Will check labs and refill medication as needed.       Orders:    amLODIPine (NORVASC) 5 MG tablet; Take 1 tablet by mouth Daily.    lisinopril-hydrochlorothiazide (PRINZIDE,ZESTORETIC) 20-25 MG per tablet; Take 1 tablet by mouth Daily.    CBC & Differential    Comprehensive Metabolic Panel

## 2025-03-25 NOTE — ASSESSMENT & PLAN NOTE
Orders:    albuterol sulfate  (90 Base) MCG/ACT inhaler; Inhale 2 puffs Every 4 (Four) Hours As Needed for Wheezing.

## 2025-03-26 LAB
ALBUMIN SERPL-MCNC: 4.3 G/DL (ref 3.9–4.9)
ALP SERPL-CCNC: 108 IU/L (ref 44–121)
ALT SERPL-CCNC: 18 IU/L (ref 0–44)
AST SERPL-CCNC: 21 IU/L (ref 0–40)
BASOPHILS # BLD AUTO: 0.1 X10E3/UL (ref 0–0.2)
BASOPHILS NFR BLD AUTO: 1 %
BILIRUB SERPL-MCNC: 0.5 MG/DL (ref 0–1.2)
BUN SERPL-MCNC: 14 MG/DL (ref 8–27)
BUN/CREAT SERPL: 11 (ref 10–24)
CALCIUM SERPL-MCNC: 10.3 MG/DL (ref 8.6–10.2)
CHLORIDE SERPL-SCNC: 104 MMOL/L (ref 96–106)
CHOLEST SERPL-MCNC: 109 MG/DL (ref 100–199)
CO2 SERPL-SCNC: 24 MMOL/L (ref 20–29)
CREAT SERPL-MCNC: 1.25 MG/DL (ref 0.76–1.27)
EGFRCR SERPLBLD CKD-EPI 2021: 62 ML/MIN/1.73
EOSINOPHIL # BLD AUTO: 0.2 X10E3/UL (ref 0–0.4)
EOSINOPHIL NFR BLD AUTO: 2 %
ERYTHROCYTE [DISTWIDTH] IN BLOOD BY AUTOMATED COUNT: 14 % (ref 11.6–15.4)
GLOBULIN SER CALC-MCNC: 2.6 G/DL (ref 1.5–4.5)
GLUCOSE SERPL-MCNC: 107 MG/DL (ref 70–99)
HCT VFR BLD AUTO: 43.6 % (ref 37.5–51)
HDLC SERPL-MCNC: 33 MG/DL
HGB BLD-MCNC: 14.4 G/DL (ref 13–17.7)
IMM GRANULOCYTES # BLD AUTO: 0 X10E3/UL (ref 0–0.1)
IMM GRANULOCYTES NFR BLD AUTO: 0 %
LDLC SERPL CALC-MCNC: 63 MG/DL (ref 0–99)
LDLC/HDLC SERPL: 1.9 RATIO (ref 0–3.6)
LYMPHOCYTES # BLD AUTO: 1.3 X10E3/UL (ref 0.7–3.1)
LYMPHOCYTES NFR BLD AUTO: 15 %
MCH RBC QN AUTO: 29 PG (ref 26.6–33)
MCHC RBC AUTO-ENTMCNC: 33 G/DL (ref 31.5–35.7)
MCV RBC AUTO: 88 FL (ref 79–97)
MONOCYTES # BLD AUTO: 0.7 X10E3/UL (ref 0.1–0.9)
MONOCYTES NFR BLD AUTO: 9 %
NEUTROPHILS # BLD AUTO: 6.2 X10E3/UL (ref 1.4–7)
NEUTROPHILS NFR BLD AUTO: 73 %
PLATELET # BLD AUTO: 301 X10E3/UL (ref 150–450)
POTASSIUM SERPL-SCNC: 4.9 MMOL/L (ref 3.5–5.2)
PROT SERPL-MCNC: 6.9 G/DL (ref 6–8.5)
PSA SERPL-MCNC: 0.6 NG/ML (ref 0–4)
RBC # BLD AUTO: 4.96 X10E6/UL (ref 4.14–5.8)
SODIUM SERPL-SCNC: 141 MMOL/L (ref 134–144)
TRIGL SERPL-MCNC: 57 MG/DL (ref 0–149)
TSH SERPL DL<=0.005 MIU/L-ACNC: 1.85 UIU/ML (ref 0.45–4.5)
VLDLC SERPL CALC-MCNC: 13 MG/DL (ref 5–40)
WBC # BLD AUTO: 8.5 X10E3/UL (ref 3.4–10.8)

## 2025-04-21 NOTE — PROGRESS NOTES
Office Note     Name: Sergei Vance    : 1955     MRN: [id number]     Chief Complaint  Anxiety (1 mos follow up )    Subjective     History of Present Illness:  Sergei Vance is a 70 y.o. male who presents today for follow up on anxiety. He said that he started and then stopped it 2 weeks later due to the side affects that he was reading. He is considering go back on the medication.   Anxiety  Presents for follow-up visit.  Symptoms include irritability, nervous/anxious behavior and restlessness.  Patient reports no chest pain, depressed mood, palpitations, shortness of breath or suicidal ideas. Symptoms occur most days. The severity of symptoms is moderate. The quality of sleep is good. Awakens seldom during the night. Past treatments include nothing. The treatment provided mild relief.     History of Present Illness  The patient is a 70-year-old male who presents for evaluation of anxiety.    He reports an overall good health status but has discontinued his Wellbutrin medication due to concerns about potential side effects. Despite reading that these side effects may take several weeks to subside, he has been experiencing heightened stress and anxiety levels since stopping the medication. He has decided to resume Wellbutrin and is seeking advice on its compatibility with moderate alcohol consumption during an upcoming vacation.     Anxiety symptoms have recently intensified, although the triggers remain unclear. A severe anxiety episode occurred following cannabis use, resulting in balance issues and difficulty maintaining head posture. He has not previously used hydroxyzine. Claustrophobia is also reported, particularly during flights, with some relief found through video-based coping strategies and exercises. No thoughts of self-harm or harm to others, suicidal ideation, or sleep disturbances are endorsed.    Recent blood work results include elevated cholesterol, low HDL, normal PSA, normal  thyroid function, normal CBC with no anemia or infection, slightly elevated glucose, and slightly elevated calcium.    SOCIAL HISTORY  He admits to smoking marijuana occasionally. He does not drink alcohol much but enjoys a glass of wine occasionally.    No Known Allergies      Current Outpatient Medications:     acyclovir (ZOVIRAX) 400 MG tablet, Take 1 tablet by mouth Daily., Disp: , Rfl:     albuterol sulfate  (90 Base) MCG/ACT inhaler, Inhale 2 puffs Every 4 (Four) Hours As Needed for Wheezing., Disp: 6.7 g, Rfl: 2    amLODIPine (NORVASC) 5 MG tablet, Take 1 tablet by mouth Daily., Disp: 90 tablet, Rfl: 3    atorvastatin (LIPITOR) 40 MG tablet, Take 1 tablet by mouth Daily., Disp: 90 tablet, Rfl: 3    buPROPion XL (WELLBUTRIN XL) 150 MG 24 hr tablet, Take 1 tablet by mouth Daily., Disp: 30 tablet, Rfl: 12    Denta 5000 Plus 1.1 % cream, USE TO BRUSH TWICE DAILY, Disp: , Rfl:     lisinopril-hydrochlorothiazide (PRINZIDE,ZESTORETIC) 20-25 MG per tablet, Take 1 tablet by mouth Daily., Disp: 90 tablet, Rfl: 3    omeprazole (priLOSEC) 40 MG capsule, Take 1 capsule by mouth Daily., Disp: 90 capsule, Rfl: 3    Review of Systems   Constitutional:  Positive for irritability.   Respiratory:  Negative for cough, shortness of breath and wheezing.    Cardiovascular:  Negative for chest pain, palpitations and leg swelling.   Psychiatric/Behavioral:  Negative for self-injury, sleep disturbance, suicidal ideas, depressed mood and stress. The patient is nervous/anxious.    All other systems reviewed and are negative.      Social History     Socioeconomic History    Marital status:    Tobacco Use    Smoking status: Former     Current packs/day: 0.00     Average packs/day: 1 pack/day for 39.0 years (39.0 ttl pk-yrs)     Types: Cigarettes     Start date:      Quit date:      Years since quittin.3    Smokeless tobacco: Former     Types: Chew     Quit date:    Vaping Use    Vaping status: Never Used  "  Substance and Sexual Activity    Alcohol use: Yes     Alcohol/week: 2.0 standard drinks of alcohol     Types: 2 Cans of beer per week    Drug use: No    Sexual activity: Defer       Family History   Problem Relation Age of Onset    Arrhythmia Mother     No Known Problems Father     Colon cancer Maternal Grandmother     Prostate cancer Maternal Grandfather            3/25/2025    10:21 AM   PHQ-2/PHQ-9 Depression Screening   Little interest or pleasure in doing things Not at all   Feeling down, depressed, or hopeless Not at all   How difficult have these problems made it for you to do your work, take care of things at home, or get along with other people? Not difficult at all       Fall Risk Screen:  SHARMILA Fall Risk Assessment was completed, and patient is at LOW risk for falls.Assessment completed on:3/25/2025      Objective     Temp 97.5 °F (36.4 °C) (Temporal)   Resp 18   Ht 177.9 cm (70.04\")   BMI 38.38 kg/m²     BP Readings from Last 2 Encounters:   03/25/25 122/78   01/14/25 132/76       Wt Readings from Last 2 Encounters:   03/25/25 121 kg (267 lb 12.8 oz)   01/14/25 116 kg (255 lb)                Physical Exam  Vitals and nursing note reviewed.   Constitutional:       General: He is not in acute distress.     Appearance: Normal appearance. He is well-groomed. He is not ill-appearing.   HENT:      Head: Normocephalic and atraumatic.   Eyes:      General: Lids are normal. Vision grossly intact.   Neck:      Vascular: No carotid bruit.   Cardiovascular:      Rate and Rhythm: Normal rate and regular rhythm.      Heart sounds: Normal heart sounds.   Pulmonary:      Effort: Pulmonary effort is normal.      Breath sounds: Normal breath sounds and air entry.   Musculoskeletal:      Right lower leg: No edema.      Left lower leg: No edema.   Skin:     General: Skin is warm and dry.   Neurological:      Mental Status: He is alert and oriented to person, place, and time. Mental status is at baseline. "   Psychiatric:         Mood and Affect: Mood normal.         Behavior: Behavior normal. Behavior is cooperative.       Derm Physical Exam  Physical Exam      Result Review :       Results  Labs   - Cholesterol: Elevated   - HDL: Low   - LDL: Good   - Glucose: Slightly elevated   - Calcium: Slightly elevated   - PSA: Normal   - Thyroid: Normal   - CBC: Normal   - Blood count: Normal   - Anemia: No anemia   - Infection: No infection    Labs:       Imaging:   No valid procedures specified.        Data reviewed:        Assessment and Plan     Plan      Assessment & Plan  Anxiety and depression  - Reported increased anxiety after discontinuing Wellbutrin due to concerns about side effects.  - Experienced a significant anxiety episode after smoking marijuana, which affected balance and ability to hold head up.  - Hydroxyzine was recommended as an alternative treatment for anxiety, to be taken as needed up to three times daily. Informed that hydroxyzine might cause drowsiness and should be taken at night if it does.  - Will resume Wellbutrin and start hydroxyzine, with prescriptions sent to [facility] in [place]. Advised to monitor alcohol intake while on Wellbutrin due to potential liver effects.           Assessment & Plan  1. Anxiety.  - Reported increased anxiety after discontinuing Wellbutrin due to concerns about side effects.  - Experienced a significant anxiety episode after smoking marijuana, which affected balance and ability to hold head up.  - Hydroxyzine was recommended as an alternative treatment for anxiety, to be taken as needed up to three times daily. Informed that hydroxyzine might cause drowsiness and should be taken at night if it does.  - Will resume Wellbutrin and start hydroxyzine, with prescriptions sent to [facility] in [place]. Advised to monitor alcohol intake while on Wellbutrin due to potential liver effects.    2. Elevated cholesterol.  - Recent blood work showed elevated cholesterol levels with low HDL  and normal LDL levels.  - Advised to increase intake of omega-3 fatty acids, salmon, and fish oils, and to engage in regular exercise to help improve HDL levels.    3. Health maintenance.  - Recent blood work showed normal PSA, thyroid, and CBC levels, with no signs of anemia or infection.  - Glucose and calcium levels were slightly elevated but not concerning.  - An MMR titer will be conducted to assess immunity status.       Follow Up   Wrapup Tab  No follow-ups on file.     Patient was given instructions and counseling regarding his condition or for health maintenance advice. Please see specific information pulled into the AVS if appropriate.  Hand hygiene was performed during entrance to exam room and following assessment of patient. This document is intended for medical expert use only.       JO ANN Gallagher, FNP-C  [facility] [address]  [facility]  [address] [place] [address] DR ROBERTO JACKSON [address] [place] [address] [place] [address] [phone]    Patient or patient representative verbalized consent for the use of Ambient Listening during the visit with  JO ANN Gallagher for chart documentation. 4/22/2025  11:55 EDT   123

## 2025-04-22 ENCOUNTER — OFFICE VISIT (OUTPATIENT)
Dept: FAMILY MEDICINE CLINIC | Facility: CLINIC | Age: 70
End: 2025-04-22
Payer: MEDICARE

## 2025-04-22 VITALS — BODY MASS INDEX: 38.38 KG/M2 | TEMPERATURE: 97.5 F | HEIGHT: 70 IN | RESPIRATION RATE: 18 BRPM

## 2025-04-22 DIAGNOSIS — F41.9 ANXIETY AND DEPRESSION: Primary | ICD-10-CM

## 2025-04-22 DIAGNOSIS — F32.A ANXIETY AND DEPRESSION: Primary | ICD-10-CM

## 2025-04-22 RX ORDER — HYDROXYZINE HYDROCHLORIDE 25 MG/1
25 TABLET, FILM COATED ORAL 3 TIMES DAILY PRN
Qty: 30 TABLET | Refills: 0 | Status: SHIPPED | OUTPATIENT
Start: 2025-04-22

## 2025-05-08 ENCOUNTER — LAB (OUTPATIENT)
Dept: LAB | Facility: HOSPITAL | Age: 70
End: 2025-05-08
Payer: MEDICARE

## 2025-05-08 ENCOUNTER — TRANSCRIBE ORDERS (OUTPATIENT)
Dept: ADMINISTRATIVE | Facility: HOSPITAL | Age: 70
End: 2025-05-08
Payer: MEDICARE

## 2025-05-08 ENCOUNTER — HOSPITAL ENCOUNTER (OUTPATIENT)
Dept: CARDIOLOGY | Facility: HOSPITAL | Age: 70
Discharge: HOME | End: 2025-05-08
Payer: MEDICARE

## 2025-05-08 DIAGNOSIS — Z01.818 PRE-OP TESTING: ICD-10-CM

## 2025-05-08 DIAGNOSIS — Z01.818 PRE-OP TESTING: Primary | ICD-10-CM

## 2025-05-08 LAB
ABO GROUP BLD: NORMAL
ANION GAP SERPL CALCULATED.3IONS-SCNC: 9.2 MMOL/L (ref 5–15)
BASOPHILS # BLD AUTO: 0.05 10*3/MM3 (ref 0–0.2)
BASOPHILS NFR BLD AUTO: 0.8 % (ref 0–1.5)
BLD GP AB SCN SERPL QL: NEGATIVE
BUN SERPL-MCNC: 15 MG/DL (ref 8–23)
BUN/CREAT SERPL: 10.6 (ref 7–25)
CALCIUM SPEC-SCNC: 10 MG/DL (ref 8.6–10.5)
CHLORIDE SERPL-SCNC: 104 MMOL/L (ref 98–107)
CO2 SERPL-SCNC: 26.8 MMOL/L (ref 22–29)
CREAT SERPL-MCNC: 1.42 MG/DL (ref 0.76–1.27)
DEPRECATED RDW RBC AUTO: 43.6 FL (ref 37–54)
EGFRCR SERPLBLD CKD-EPI 2021: 53.5 ML/MIN/1.73
EOSINOPHIL # BLD AUTO: 0.23 10*3/MM3 (ref 0–0.4)
EOSINOPHIL NFR BLD AUTO: 3.6 % (ref 0.3–6.2)
ERYTHROCYTE [DISTWIDTH] IN BLOOD BY AUTOMATED COUNT: 13.5 % (ref 12.3–15.4)
GLUCOSE SERPL-MCNC: 112 MG/DL (ref 65–99)
HCT VFR BLD AUTO: 43.9 % (ref 37.5–51)
HGB BLD-MCNC: 14.2 G/DL (ref 13–17.7)
IMM GRANULOCYTES # BLD AUTO: 0.01 10*3/MM3 (ref 0–0.05)
IMM GRANULOCYTES NFR BLD AUTO: 0.2 % (ref 0–0.5)
INR PPP: 1.1 (ref 0.9–1.1)
LYMPHOCYTES # BLD AUTO: 1.01 10*3/MM3 (ref 0.7–3.1)
LYMPHOCYTES NFR BLD AUTO: 16 % (ref 19.6–45.3)
MCH RBC QN AUTO: 28.7 PG (ref 26.6–33)
MCHC RBC AUTO-ENTMCNC: 32.3 G/DL (ref 31.5–35.7)
MCV RBC AUTO: 88.7 FL (ref 79–97)
MONOCYTES # BLD AUTO: 0.6 10*3/MM3 (ref 0.1–0.9)
MONOCYTES NFR BLD AUTO: 9.5 % (ref 5–12)
NEUTROPHILS NFR BLD AUTO: 4.42 10*3/MM3 (ref 1.7–7)
NEUTROPHILS NFR BLD AUTO: 69.9 % (ref 42.7–76)
NRBC BLD AUTO-RTO: 0 /100 WBC (ref 0–0.2)
PLATELET # BLD AUTO: 246 10*3/MM3 (ref 140–450)
PMV BLD AUTO: 10.8 FL (ref 6–12)
POTASSIUM SERPL-SCNC: 4.6 MMOL/L (ref 3.5–5.2)
PROTHROMBIN TIME: 14.2 SECONDS (ref 11.7–14.2)
QT INTERVAL: 431 MS
QTC INTERVAL: 407 MS
RBC # BLD AUTO: 4.95 10*6/MM3 (ref 4.14–5.8)
RH BLD: POSITIVE
SODIUM SERPL-SCNC: 140 MMOL/L (ref 136–145)
T&S EXPIRATION DATE: NORMAL
WBC NRBC COR # BLD AUTO: 6.32 10*3/MM3 (ref 3.4–10.8)

## 2025-05-08 PROCEDURE — 86850 RBC ANTIBODY SCREEN: CPT

## 2025-05-08 PROCEDURE — 86900 BLOOD TYPING SEROLOGIC ABO: CPT

## 2025-05-08 PROCEDURE — 93005 ELECTROCARDIOGRAM TRACING: CPT | Performed by: UROLOGY

## 2025-05-08 PROCEDURE — 36415 COLL VENOUS BLD VENIPUNCTURE: CPT

## 2025-05-08 PROCEDURE — 80048 BASIC METABOLIC PNL TOTAL CA: CPT

## 2025-05-08 PROCEDURE — 86901 BLOOD TYPING SEROLOGIC RH(D): CPT

## 2025-05-08 PROCEDURE — 85610 PROTHROMBIN TIME: CPT

## 2025-05-08 PROCEDURE — 85025 COMPLETE CBC W/AUTO DIFF WBC: CPT

## 2025-05-19 ENCOUNTER — LAB REQUISITION (OUTPATIENT)
Dept: LAB | Facility: HOSPITAL | Age: 70
End: 2025-05-19
Payer: MEDICARE

## 2025-05-19 DIAGNOSIS — N20.0 CALCULUS OF KIDNEY: ICD-10-CM

## 2025-05-19 PROCEDURE — 82365 CALCULUS SPECTROSCOPY: CPT | Performed by: UROLOGY

## 2025-06-04 LAB
COLOR STONE: NORMAL
LABORATORY COMMENT REPORT: NORMAL
SIZE STONE: NORMAL MM
SPEC SOURCE SUBJ: NORMAL
STONE ANALYSIS-IMP: NORMAL
WT STONE: 26 MG